# Patient Record
Sex: FEMALE | Race: WHITE
[De-identification: names, ages, dates, MRNs, and addresses within clinical notes are randomized per-mention and may not be internally consistent; named-entity substitution may affect disease eponyms.]

---

## 2017-04-17 ENCOUNTER — HOSPITAL ENCOUNTER (OUTPATIENT)
Dept: HOSPITAL 14 - H.ER | Age: 72
Setting detail: OBSERVATION
LOS: 1 days | Discharge: HOME | End: 2017-04-18
Payer: COMMERCIAL

## 2017-04-17 VITALS — RESPIRATION RATE: 18 BRPM

## 2017-04-17 DIAGNOSIS — Z83.3: ICD-10-CM

## 2017-04-17 DIAGNOSIS — R07.89: Primary | ICD-10-CM

## 2017-04-17 DIAGNOSIS — E78.5: ICD-10-CM

## 2017-04-17 DIAGNOSIS — I25.10: ICD-10-CM

## 2017-04-17 DIAGNOSIS — L29.9: ICD-10-CM

## 2017-04-17 DIAGNOSIS — I10: ICD-10-CM

## 2017-04-17 DIAGNOSIS — E78.00: ICD-10-CM

## 2017-04-17 DIAGNOSIS — Z87.891: ICD-10-CM

## 2017-04-17 DIAGNOSIS — Z95.5: ICD-10-CM

## 2017-04-17 DIAGNOSIS — Z82.5: ICD-10-CM

## 2017-04-17 LAB
ALBUMIN/GLOB SERPL: 1 {RATIO} (ref 1–2.1)
ALP SERPL-CCNC: 80 U/L (ref 38–126)
ALT SERPL-CCNC: 21 U/L (ref 9–52)
AST SERPL-CCNC: 22 U/L (ref 14–36)
BASOPHILS # BLD AUTO: 0 K/UL (ref 0–0.2)
BASOPHILS NFR BLD: 0.4 % (ref 0–2)
BILIRUB SERPL-MCNC: 0.7 MG/DL (ref 0.2–1.3)
BUN SERPL-MCNC: 15 MG/DL (ref 7–17)
CALCIUM SERPL-MCNC: 9.5 MG/DL (ref 8.4–10.2)
CHLORIDE SERPL-SCNC: 107 MMOL/L (ref 98–107)
CO2 SERPL-SCNC: 23 MMOL/L (ref 22–30)
EOSINOPHIL # BLD AUTO: 0.1 K/UL (ref 0–0.7)
EOSINOPHIL NFR BLD: 0.9 % (ref 0–4)
ERYTHROCYTE [DISTWIDTH] IN BLOOD BY AUTOMATED COUNT: 15.1 % (ref 11.5–14.5)
GLOBULIN SER-MCNC: 3.8 GM/DL (ref 2.2–3.9)
GLUCOSE SERPL-MCNC: 97 MG/DL (ref 65–105)
HCT VFR BLD CALC: 42.6 % (ref 34–47)
LYMPHOCYTES # BLD AUTO: 3 K/UL (ref 1–4.3)
LYMPHOCYTES NFR BLD AUTO: 41.5 % (ref 20–40)
MCH RBC QN AUTO: 27.9 PG (ref 27–31)
MCHC RBC AUTO-ENTMCNC: 32.8 G/DL (ref 33–37)
MCV RBC AUTO: 85.3 FL (ref 81–99)
MONOCYTES # BLD: 0.6 K/UL (ref 0–0.8)
MONOCYTES NFR BLD: 8.4 % (ref 0–10)
NEUTROPHILS # BLD: 3.5 K/UL (ref 1.8–7)
NEUTROPHILS NFR BLD AUTO: 48.8 % (ref 50–75)
NRBC BLD AUTO-RTO: 0.1 % (ref 0–0)
PLATELET # BLD: 261 K/UL (ref 130–400)
PMV BLD AUTO: 8.7 FL (ref 7.2–11.7)
POTASSIUM SERPL-SCNC: 4.2 MMOL/L (ref 3.6–5)
PROT SERPL-MCNC: 7.7 G/DL (ref 6.3–8.2)
SODIUM SERPL-SCNC: 144 MMOL/L (ref 132–148)
WBC # BLD AUTO: 7.1 K/UL (ref 4.8–10.8)

## 2017-04-17 PROCEDURE — 85025 COMPLETE CBC W/AUTO DIFF WBC: CPT

## 2017-04-17 PROCEDURE — 80061 LIPID PANEL: CPT

## 2017-04-17 PROCEDURE — 99283 EMERGENCY DEPT VISIT LOW MDM: CPT

## 2017-04-17 PROCEDURE — 93306 TTE W/DOPPLER COMPLETE: CPT

## 2017-04-17 PROCEDURE — 93005 ELECTROCARDIOGRAM TRACING: CPT

## 2017-04-17 PROCEDURE — 84436 ASSAY OF TOTAL THYROXINE: CPT

## 2017-04-17 PROCEDURE — 84484 ASSAY OF TROPONIN QUANT: CPT

## 2017-04-17 PROCEDURE — 84443 ASSAY THYROID STIM HORMONE: CPT

## 2017-04-17 PROCEDURE — 36415 COLL VENOUS BLD VENIPUNCTURE: CPT

## 2017-04-17 PROCEDURE — 96372 THER/PROPH/DIAG INJ SC/IM: CPT

## 2017-04-17 PROCEDURE — 71010: CPT

## 2017-04-17 PROCEDURE — 80053 COMPREHEN METABOLIC PANEL: CPT

## 2017-04-17 NOTE — CP.PCM.HP
History of Present Illness





- History of Present Illness


History of Present Illness: 


Hospitalist Admission H&P (Patient was seen at 3 PM 4/17/17 ER Bed #7 with the 

help [place]  [id number])





PMD: Dr. Ge





CODE STATUS: FULL CODE. NO living will/advance directive. Designates Daughter 

Tosin [phone] as her Health Care Proxy





CHIEF COMPLAINT: Chest Pain





71 year old female (PMHx: HTN, HLD, CAD with Cardiac Catheterization with Stent 

x 3) who presents to [facility] ER via taxi with a chief complaint of chest pain. 

Patient states that she has been having this chest pain on and off for the past 

few days. It is located in the bilateral chest area, is burning in nature, 

lasts for 45 minutes with each occurrence, not associated with any paresthesias

, not radiating to either neck/shoulders/upper extremities. However it is 

associated with (+) SOB. Each time that it has occurred she has been sitting 

down and she just tries to calm herself and the pain will go away after about 

45 minutes. She decided to come to the ER today because when she awoke this 

morning, the aforementioned pain was present and associated with (+) Dizziness, 

(+)Nausea, and (+)Generalized Weakness. Therefore she took a taxi to the ER.





Upon Full ROS she is currently not experiencing the chest pain, is not short of 

breath, is not dizzy, is not nauseous. There is NO dysphagia/odynophagia, NO 

abdominal pain, NO v/d/c, NO burning/pain with urination, NO lightheadedness, 

NO headache, NO new changes in vision/eye pain, NO new changes in hearing/ear 

pain, NO edema, NO paresthesias, (+) Rash after eating fish on this past 

Saturday





PMHx: HTN, HLD, CAD with Cardiac Catheterization 4 years ago with 3 stents at 

an unknown institution (she does not remember the name and can not provide any 

information concerning who her Cardiologist was in the past and has not seen a 

Cardiologist for over 1 year)


PSHx: Cardiac Catheterization


ALL: Unknown food allergy (possibly fish)


Medication: She could not provide much information as to her medications and 

she could only state that she was taking Metoprolol and Hydralazine once a day 

in the morning. Her Daughter who came later during the exam provided her [facility] 

[facility]armacy # [phone] and when I spoke with the pharmacist (the ER pharmacist 

also called them) and the following medications were confirmed: Metoprolol 

Tartrate 100 mg PO 2x/day, Hydralazine 50 mg PO 2x/day, Diovan/HCTZ 320/25 mg 

PO 1x/day, Plavix 75 mg PO 1x/day, Zetia 10 mg PO 1x/day. Please note that the 

daughter brought in the bottles later after my exam and it was noted that the 

bottles for Diovan/HCTZ and Plavix were almost full as patient was not taking 

these medications (this was relayed by the ER Pharmacist)


Social Hx: Retired [employer] , Lives with GrandDaughter, (+) Tobacco 

4 cig/day for 40 years quit 5 years ago, NO alcohol, NO drugs


Family Hx: Mom (Asthma, DM2), Dad (Alcoholism), Sister (DM 2, HLD)








Present on Admission





- Present on Admission


Any Indicators Present on Admission: Yes


History of DVT/PE: No


History of Uncontrolled Diabetes: No


Urinary Catheter: No


Decubitus Ulcer Present: No





Review of Systems





- Review of Systems


Review of Systems: 


SEE HPI





Past Patient History





- Past Medical History & Family History


Pertinent Family History: 


SEE HPI





- Past Social History


Smoking Status: Never Smoked





- CARDIAC


Hx Hypercholesterolemia: Yes


Hx Hypertension: Yes





- PSYCHIATRIC


Hx Substance Use: No





- SURGICAL HISTORY


Other/Comment: Throat surgery





Meds


Allergies/Adverse Reactions: 


 Allergies











Allergy/AdvReac Type Severity Reaction Status Date / Time


 


enoxaparin sodium Allergy  REDNESS Verified 04/17/17 22:01





[From Lovenox]     














Physical Exam





- Constitutional


Appears: Non-toxic, No Acute Distress





- Head Exam


Head Exam: ATRAUMATIC, NORMAL INSPECTION, NORMOCEPHALIC





- Eye Exam


Eye Exam: EOMI, Normal appearance, PERRL


Pupil Exam: NORMAL ACCOMODATION, PERRL





- ENT Exam


ENT Exam: Mucous Membranes Moist, Normal Exam, Normal External Ear Exam, Normal 

Oropharynx





- Neck Exam


Neck exam: Positive for: Normal Inspection


Additional comments: 


NO LYMPHADENOPATHY


NO THYROMEGALY





- Respiratory Exam


Respiratory Exam: Clear to Auscultation Bilateral, NORMAL BREATHING PATTERN


Additional comments: 


NO R/R/W





- Cardiovascular Exam


Cardiovascular Exam: REGULAR RHYTHM, +S1, +S2


Additional comments: 


NO M/R/G





- GI/Abdominal Exam


Additional comments: 


BSX4, SOFT, NT,ND, NO HSM, NO GUARDING/REBOUND TENDERNESS





- Extremities Exam


Extremities exam: Positive for: normal inspection


Additional comments: 


NORMAL CAPILLARY REFILL


NO EDEMA


PULSES ARE STRONG AND EQUAL





- Neurological Exam


Neurological exam: Alert, CN II-XII Intact, Oriented x3





- Psychiatric Exam


Psychiatric exam: Normal Affect, Normal Mood





Results





- Vital Signs


Recent Vital Signs: 





 Last Vital Signs











Temp  98.4 F   04/17/17 21:34


 


Pulse  75   04/17/17 21:34


 


Resp  14   04/17/17 21:34


 


BP  133/68   04/17/17 21:34


 


Pulse Ox  95   04/17/17 21:34














- Labs


Result Diagrams: 


 04/17/17 13:33





 04/17/17 13:33


Labs: 





 Laboratory Results - last 24 hr











  04/17/17





  19:13


 


Troponin I  < 0.0120














- EKG Data


EKG Interpreted by: Myself (T WAVE INVERSION IN I, aVL, V4, V5, V6)





- Imaging and Cardiology


  ** Chest x-ray


Status: Image reviewed by me (NO ACTIVE DISEASE)





Assessment & Plan


(1) Atypical chest pain


Assessment and Plan: 


Place on Observation in Telemetry Unit


Troponin and EKG at 7:30 PM 4/17/17 and 1:30 AM 4/18/17


Consult Cardiologist Dr. Augustine for further recommendations


F/U TSH, T4, and Lipid Panel


Considering her Cardiac History, lack of Cardiology follow up as an outpatient, 

and apparent non-compliance with her medications, 2D Echocardiogram has also 

been ordered


Status: Acute





(2) Allergic reaction


Assessment and Plan: 


Patient states that she developed a pruritic rash on the bilateral antecubital 

fossas, left supraclavicular area, and the bilateral upper back after eating 

some fish this past Saturday. She used some benadryl at home.


On exam there is a erythematous maculopapular rash in the aforementioned areas


NO evidence of respiratory distress/edema on exam


Benadryl 25 mg PO Q6H PRN Pruritus


Pepcid 20 mg PO Q12H


Hydrocortisone 2.5% Cream Topical to the affected areas 2x/day


Status: Suspected





(3) Hypertension


Assessment and Plan: 


Metoprolol 100 mg PO 2x/day


Hydralazine 50 mg PO 2x/day


Diovan 320 mg PO 1x/day


HCTZ 25 mg PO 1x/day


Compliance to medications stressed


Status: Chronic





(4) Hx of coronary artery disease


Assessment and Plan: 


Metoprolol as above


Zetia 10 mg PO 1x/day


Plavix 75 mg PO 1x/day


F/U 2D Echocardiogram


She will need outpatient Cardiology Follow Up


Status: Chronic





(5) Prophylactic measure


Assessment and Plan: 


Lovenox 40 mg SQ 1x/day


Pepcid 20 mg PO 2x/day


Heart Health Diet








Status: Acute

## 2017-04-17 NOTE — RAD
HISTORY:

Cough. Technique: Single view portable semi erect @ 13:52.



COMPARISON:

06/23/2014. 



FINDINGS:



LUNGS:

No active pulmonary disease.



PLEURA:

No significant pleural effusion identified, no pneumothorax apparent.



CARDIOVASCULAR:

 No radiographic findings to suggest acute or significant 

cardiovascular disease.



OSSEOUS STRUCTURES:

No significant abnormalities.



VISUALIZED UPPER ABDOMEN:

Normal.



OTHER FINDINGS:

None.



IMPRESSION:

No active disease. No significant interval change compared to the 

prior examination(s).

## 2017-04-17 NOTE — ED PDOC
HPI: General Adult


Time Seen by Provider: 17 13:09


Chief Complaint (Nursing): Chest Pain


Chief Complaint (Provider): chest pain


History Per: Patient


History/Exam Limitations: no limitations


Additional Complaint(s): 


72yo female complaining of chest pain for 3 days. Also reports dizziness. No 

shortness of breath, nausea, vomit. 





PMD: Joo





Past Medical History


Reviewed: Historical Data, Nursing Documentation, Vital Signs


Vital Signs: 


 Last Vital Signs











Temp  98.1 F   17 12:58


 


Pulse  56 L  17 14:46


 


Resp  16   17 14:46


 


BP  111/70   17 14:46


 


Pulse Ox  98   17 14:46














- Medical History


PMH: HTN, Hypercholesterolemia





- Surgical History


Surgical History: No Surg Hx





- Family History


Family History: States: Unknown Family Hx





- Allergies


Allergies/Adverse Reactions: 


 Allergies











Allergy/AdvReac Type Severity Reaction Status Date / Time


 


No Known Allergies Allergy   Verified 09/14/15 22:39














Review of Systems


ROS Statement: Except As Marked, All Systems Reviewed And Found Negative


Cardiovascular: Positive for: Chest Pain


Respiratory: Negative for: Shortness of Breath


Gastrointestinal: Negative for: Nausea, Vomiting


Neurological: Positive for: Dizziness





Physical Exam





- Reviewed


Nursing Documentation Reviewed: Yes


Vital Signs Reviewed: Yes





- Physical Exam


Appears: Positive for: Well, Non-toxic, No Acute Distress


Head Exam: Positive for: ATRAUMATIC, NORMAL INSPECTION, NORMOCEPHALIC


Skin: Positive for: Warm, Dry


Eye Exam: Positive for: EOMI, PERRL


Cardiovascular/Chest: Positive for: Regular Rate, Rhythm


Respiratory: Positive for: Normal Breath Sounds.  Negative for: Rales, Rhonchi, 

Wheezing


Gastrointestinal/Abdominal: Positive for: Soft.  Negative for: Tenderness


Extremity: Positive for: Normal ROM


Neurologic/Psych: Positive for: Alert, Oriented





- Laboratory Results


Result Diagrams: 


 17 13:33





 17 13:33





- ECG


O2 Sat by Pulse Oximetry: 98 (RA)


Pulse Ox Interpretation: Normal





Medical Decision Making


Medical Decision Makin:


EKG, CXR, Labs ordered. 





Disposition





- Clinical Impression


Clinical Impression: 


 Chest pain





- Patient ED Disposition


Is Patient to be Admitted: Yes





- Disposition


Disposition Time: 14:54


Condition: FAIR





- Pt Status Changed To:


Hospital Disposition Of: Observation





- POA


Present On Arrival: None





Additional Comments





- Additional Comments


Additional Comments: 


Scribe Attestation:


Documented by Mario Moss acting as a scribe for Agusto Gallagher MD.





Provider Scribe Attestation:


All medical record entries made by the Scribe were at my direction and 

personally dictated by me. I have reviewed the chart and agree that the record 

accurately reflects my personal performance of the history, physical exam, 

medical decision making, and the department course for this patient. I have 

also personally directed, reviewed, and agree with the discharge instructions 

and disposition.

## 2017-04-18 VITALS
SYSTOLIC BLOOD PRESSURE: 126 MMHG | TEMPERATURE: 98.2 F | HEART RATE: 74 BPM | DIASTOLIC BLOOD PRESSURE: 70 MMHG | OXYGEN SATURATION: 95 %

## 2017-04-18 LAB
CHOLEST SERPL-MCNC: 182 MG/DL (ref 0–199)
T4 SERPL-MCNC: 8.94 UG/DL (ref 5.5–11)
TSH SERPL-ACNC: 2.51 MIU/ML (ref 0.46–4.68)

## 2017-04-18 NOTE — CP.PCM.DIS
Provider





- Provider


Date of Admission: 


04/17/17 14:54





Attending physician: 


Levi Ly MD





Consults: 


Dr Augustine


Time Spent in preparation of Discharge (in minutes): 30





Diagnosis





- Discharge Diagnosis


(1) Chest pain


Status: Acute


Comment: serial Troponins were all negative.  has been asymptomatic since 

admission








(2) CAD (coronary artery disease)


Status: Acute


Comment: continue Plavix, Metoprolol, Valsartan and Zetia








(3) Hypertension


Status: Chronic


Comment: BP stable.  continue Vlasartan/HCTZ, Metoprolol and Hydralazine











Hospital Course





- Lab Results


Lab Results: 


 Most Recent Lab Values











WBC  7.1 K/uL (4.8-10.8)   04/17/17  13:33    


 


RBC  5.00 Mil/uL (3.80-5.20)   04/17/17  13:33    


 


Hgb  14.0 g/dL (12.0-16.0)   04/17/17  13:33    


 


Hct  42.6 % (34.0-47.0)   04/17/17  13:33    


 


MCV  85.3 fl (81.0-99.0)  D 04/17/17  13:33    


 


MCH  27.9 pg (27.0-31.0)   04/17/17  13:33    


 


MCHC  32.8 g/dL (33.0-37.0)  L  04/17/17  13:33    


 


RDW  15.1 % (11.5-14.5)  H  04/17/17  13:33    


 


Plt Count  261 K/uL (130-400)   04/17/17  13:33    


 


MPV  8.7 fl (7.2-11.7)   04/17/17  13:33    


 


Neut % (Auto)  48.8 % (50.0-75.0)  L  04/17/17  13:33    


 


Lymph % (Auto)  41.5 % (20.0-40.0)  H  04/17/17  13:33    


 


Mono % (Auto)  8.4 % (0.0-10.0)   04/17/17  13:33    


 


Eos % (Auto)  0.9 % (0.0-4.0)   04/17/17  13:33    


 


Baso % (Auto)  0.4 % (0.0-2.0)   04/17/17  13:33    


 


Neut #  3.5 K/uL (1.8-7.0)   04/17/17  13:33    


 


Lymph #  3.0 K/uL (1.0-4.3)   04/17/17  13:33    


 


Mono #  0.6 K/uL (0.0-0.8)   04/17/17  13:33    


 


Eos #  0.1 K/uL (0.0-0.7)   04/17/17  13:33    


 


Baso #  0.0 K/uL (0.0-0.2)   04/17/17  13:33    


 


Sodium  144 mmol/l (132-148)   04/17/17  13:33    


 


Potassium  4.2 MMOL/L (3.6-5.0)   04/17/17  13:33    


 


Chloride  107 mmol/L ()   04/17/17  13:33    


 


Carbon Dioxide  23 mmol/L (22-30)   04/17/17  13:33    


 


Anion Gap  18  (10-20)   04/17/17  13:33    


 


BUN  15 mg/dl (7-17)   04/17/17  13:33    


 


Creatinine  1.1 mg/dL (0.7-1.2)   04/17/17  13:33    


 


Est GFR ( Amer)  59   04/17/17  13:33    


 


Est GFR (Non-Af Amer)  49   04/17/17  13:33    


 


Random Glucose  97 mg/dL ()   04/17/17  13:33    


 


Calcium  9.5 mg/dL (8.4-10.2)   04/17/17  13:33    


 


Total Bilirubin  0.7 mg/dl (0.2-1.3)   04/17/17  13:33    


 


AST  22 U/L (14-36)   04/17/17  13:33    


 


ALT  21 U/L (9-52)   04/17/17  13:33    


 


Alkaline Phosphatase  80 U/L ()   04/17/17  13:33    


 


Troponin I  < 0.0120 ng/mL (0.00-0.120)   04/18/17  06:10    


 


Total Protein  7.7 G/DL (6.3-8.2)   04/17/17  13:33    


 


Albumin  3.9 g/dL (3.5-5.0)   04/17/17  13:33    


 


Globulin  3.8 gm/dL (2.2-3.9)   04/17/17  13:33    


 


Albumin/Globulin Ratio  1.0  (1.0-2.1)   04/17/17  13:33    


 


Triglycerides  147 mg/DL (0-149)   04/18/17  06:10    


 


Cholesterol  182 mg/dL (0-199)   04/18/17  06:10    


 


LDL Cholesterol Direct  110 mg/dL (0-129)   04/18/17  06:10    


 


HDL Cholesterol  30 MG/DL (30-70)   04/18/17  06:10    


 


Thyroxine (T4)  8.94 ug/dl (5.5-11.0)   04/18/17  06:10    


 


TSH 3rd Generation  2.51 mIU/ML (0.46-4.68)   04/18/17  06:10    














- Hospital Course


Hospital Course: 


72 yo female with history of CAD, HTN and HLD admitted to telemetry because of 

on and off chest pain, non-radiating  and described as burning in character. 

She however was chest pain free since she was admitted in the unit. Serial 

Troponins were negative. Dr Augustine, cardiology consult recommended to 

discharge patient discharge patient and he would follow up on her ECHO reading.














Discharge Exam





- Head Exam


Head Exam: ATRAUMATIC, NORMAL INSPECTION, NORMOCEPHALIC





- Eye Exam


Eye Exam: absent: Nystagmus





- ENT Exam


ENT Exam: Mucous Membranes Moist





- Respiratory Exam


Respiratory Exam: absent: Wheezes, Respiratory Distress





- Cardiovascular Exam


Cardiovascular Exam: REGULAR RHYTHM, +S1, +S2





- GI/Abdominal Exam


GI & Abdominal Exam: Soft.  absent: Tenderness





- Rectal Exam


Rectal Exam: Deferred





- Neurological Exam


Neurological exam: Alert, Oriented x3





- Psychiatric Exam


Psychiatric exam: Normal Affect





- Skin


Skin Exam: Dry, Intact





Discharge Plan





- Follow Up Plan


Condition: FAIR


Disposition: HOME/ ROUTINE

## 2017-04-18 NOTE — CARD
--------------- APPROVED REPORT --------------





EKG Measurement

Heart [id number]

MS [id number]

[id number]

[id number]

[id number]



<Conclusion>

Normal sinus rhythm

Possible Left atrial enlargement

Cannot rule out Inferior infarct, age undetermined

ST & T wave abnormality, consider lateral ischemia

Abnormal ECG

## 2017-04-18 NOTE — CP.PCM.CON
History of Present Illness





- History of Present Illness


History of Present Illness: 





71 year old female (PMHx: HTN, HLD, CAD with Cardiac Catheterization with Stent 

x 3) 


who presents to [facility] ER via taxi with a chief complaint of chest pain. 


Patient states that she has been having this chest pain on and off for the past 

few days.


 It is located in the bilateral chest area, is burning in nature, lasts for 45 

minutes with each occurrence





Pain not present on admission





Troponin: neg





EKG: wnl





Does not get pain with exertion





Review of Systems





- Cardiovascular


Cardiovascular: Chest Pain at Rest





Past Patient History





- Past Medical History & Family History


Past Medical History?: Yes





- Past Social History


Smoking Status: Never Smoked





- CARDIAC


Hx Cardiac Disorders: Yes


Hx Hypercholesterolemia: Yes


Hx Hypertension: Yes


Other/Comment: CAD w/ Cath w/ stent x 3





- PULMONARY


Hx Respiratory Disorders: No





- NEUROLOGICAL


Hx Neurological Disorder: No





- HEENT


Hx HEENT Problems: No





- RENAL


Hx Chronic Kidney Disease: No





- ENDOCRINE/METABOLIC


Hx Endocrine Disorders: No





- MUSCULOSKELETAL/RHEUMATOLOGICAL


Hx Falls: No





- PSYCHIATRIC


Hx Substance Use: No





- SURGICAL HISTORY


Hx Surgeries: Yes


Hx Coronary Stent: Yes


Other/Comment: Throat surgery





- ANESTHESIA


Hx Anesthesia: Yes


Hx Anesthesia Reactions: No





Meds


Allergies/Adverse Reactions: 


 Allergies











Allergy/AdvReac Type Severity Reaction Status Date / Time


 


enoxaparin sodium Allergy  REDNESS Verified 04/17/17 22:01





[From Lovenox]     














- Medications


Medications: 


 Current Medications





Clopidogrel Bisulfate (Plavix)  75 mg PO DAILY [facility]


   Last Admin: 04/18/17 10:11 Dose:  75 mg


Diphenhydramine HCl (Benadryl)  25 mg PO Q6 PRN


   PRN Reason: Itching / Pruritus


   Last Admin: 04/17/17 18:46 Dose:  25 mg


Ezetimibe (Zetia)  10 mg PO DAILY [facility]


   Last Admin: 04/18/17 10:11 Dose:  10 mg


Famotidine (Pepcid)  20 mg PO BID [facility]


   Last Admin: 04/18/17 10:11 Dose:  20 mg


Hydralazine HCl (Apresoline)  50 mg PO Q12 [facility]


   Last Admin: 04/18/17 10:11 Dose:  50 mg


Hydrochlorothiazide (Hydrodiuril)  25 mg PO DAILY [facility]


   Last Admin: 04/18/17 10:12 Dose:  25 mg


Hydrocortisone (Hydrocortisone 2.5%)  1 applic TOP BID [facility]


   Last Admin: 04/18/17 10:12 Dose:  1 applic


Metoprolol Tartrate (Lopressor)  100 mg PO Q12 [facility]


   Last Admin: 04/18/17 10:12 Dose:  100 mg


Valsartan (Diovan)  320 mg PO DAILY [facility]


   Last Admin: 04/18/17 10:11 Dose:  320 mg











Results





- Vital Signs


Recent Vital Signs: 


 Last Vital Signs











Temp  98.7 F   04/18/17 08:00


 


Pulse  65   04/18/17 08:00


 


Resp  18   04/18/17 08:00


 


BP  122/72   04/18/17 10:12


 


Pulse Ox  96   04/18/17 08:00














- Labs


Result Diagrams: 


 04/17/17 13:33





 04/17/17 13:33


Labs: 


 Laboratory Results - last 24 hr











  04/17/17 04/18/17





  19:13 06:10


 


Troponin I  < 0.0120  < 0.0120


 


Triglycerides   147


 


Cholesterol   182


 


LDL Cholesterol Direct   110


 


HDL Cholesterol   30


 


Thyroxine (T4)   8.94


 


TSH 3rd Generation   2.51














Assessment & Plan


(1) Atypical chest pain


Assessment and Plan: 


Pain is not classic for CAD


but should f/u with her PMD





may be discharged


Status: Resolved





(2) Hx of coronary artery disease


Status: Chronic





(3) Hypertension


Status: Chronic

## 2017-04-19 NOTE — CARD
--------------- APPROVED REPORT --------------





EXAM: Two-dimensional and M-mode echocardiogram with Doppler and 

color Doppler.



Other Information 

Quality : GoodRhythm : NSR



INDICATION

Chest Pain 



2D DIMENSIONS 

IVSd1.13   (0.7-1.1cm)LVDd4.52   (3.9-5.9cm)

LVOT Diameter2.17   (1.8-2.4cm)PWd0.74   (0.7-1.1cm)

IVSs1.89   (0.8-1.2cm)LVDs2.62   (2.5-4.0cm)

FS (%) 42.1   %PWs1.42   (0.8-1.2cm)



M-Mode DIMENSIONS 

Left Atrium (MM)3.28   (2.5-4.0cm)IVSd1.01   (0.7-1.1cm)

Aortic Root3.00   (2.2-3.7cm)LVDd5.10   (4.0-5.6cm)

Aortic Cusp Exc.1.64   (1.5-2.0cm)PWd0.91   (0.7-1.1cm)

IVSs1.26   cmFS (%) 28   %

LVDs3.67   (2.0-3.8cm)PWs0.91   cm



Mitral Valve

MV E Hfnetpty87.1cm/sMV DECEL [id number] A Ptutytuo63.8cm/s

MV CSQ46ukK/A ratio0.8MVA (PHT)3.10cm2



TDI

Lateral E' Peak V7.82cm/sMedial E' Peak V4.97cm/sE/Lateral E'6.5

E/Medial E'10.3



Tricuspid Valve

TR Peak Tlollpcx017nr/sRAP [id number] Peak Gr.20mmHg

[id number]



 LEFT VENTRICLE 

The left ventricle is normal size.

There is normal left ventricular wall thickness.

The left ventricular function is normal.

The left ventricular ejection fraction is 65%

There is normal LV segmental wall motion.

The left ventricular diastolic function is normal.

No left ventricle thrombus noted on this study.

There is no ventricular septal defect visualized.

There is no left ventricular aneurysm. 

There is no mass noted in the left ventricle.



 RIGHT VENTRICLE 

The right ventricle is normal size.

There is normal right ventricular wall thickness.

The right ventricular systolic function is normal.



 ATRIA 

The left atrium size is normal.

The right atrium size is normal.

The interatrial septum is intact with no evidence for an atrial 

septal defect.



 AORTIC VALVE 

The aortic valve is normal in structure and function.

No aortic regurgitation is present.

There is no aortic valvular stenosis. 

There is no aortic valvular vegetation.



 MITRAL VALVE 

The mitral valve is normal in structure and function.

There is no evidence of mitral valve prolapse.

There is no mitral valve stenosis.

There is no mitral valve regurgitation noted.



 TRICUSPID VALVE 

The tricuspid valve is normal in structure and function.

There is no tricuspid valve regurgitation noted.

There is no tricuspid valve prolapse or vegetation.

There is no tricuspid valve stenosis. 



 PULMONIC VALVE 

The pulmonary valve is normal in structure and function.

There is no pulmonic valvular regurgitation. 

There is no pulmonic valvular stenosis.



 GREAT VESSELS 

The aortic root is normal in size.

The ascending aorta is normal in size.

The IVC is normal in size and collapses >50% with inspiration.



 PERICARDIAL EFFUSION 

There is a small anterior pericardial effusion. There are no 

echocardiographic signs of tamponade.

There is no pleural effusion.



<Conclusion>

Normal LV Systolic Function

Small Anterior Pericardial Effusion

## 2017-04-19 NOTE — CARD
--------------- APPROVED REPORT --------------





EKG Measurement

Heart [id number]

MS [id number]

[id number]

[id number]

[id number]



<Conclusion>

Normal sinus rhythm

Possible Left atrial enlargement

Cannot rule out Inferior infarct, age undetermined

T wave abnormality, consider lateral ischemia

Abnormal ECG

## 2017-10-19 ENCOUNTER — HOSPITAL ENCOUNTER (OUTPATIENT)
Dept: HOSPITAL 14 - H.ER | Age: 72
Setting detail: OBSERVATION
LOS: 1 days | Discharge: HOME | End: 2017-10-20
Attending: INTERNAL MEDICINE | Admitting: INTERNAL MEDICINE
Payer: MEDICARE

## 2017-10-19 VITALS — RESPIRATION RATE: 18 BRPM

## 2017-10-19 DIAGNOSIS — I25.10: ICD-10-CM

## 2017-10-19 DIAGNOSIS — E78.00: ICD-10-CM

## 2017-10-19 DIAGNOSIS — E66.3: ICD-10-CM

## 2017-10-19 DIAGNOSIS — I10: ICD-10-CM

## 2017-10-19 DIAGNOSIS — Z79.82: ICD-10-CM

## 2017-10-19 DIAGNOSIS — Z95.5: ICD-10-CM

## 2017-10-19 DIAGNOSIS — R07.89: Primary | ICD-10-CM

## 2017-10-19 DIAGNOSIS — G44.209: ICD-10-CM

## 2017-10-19 DIAGNOSIS — Z87.891: ICD-10-CM

## 2017-10-19 DIAGNOSIS — Z79.02: ICD-10-CM

## 2017-10-19 DIAGNOSIS — Z79.899: ICD-10-CM

## 2017-10-19 DIAGNOSIS — E78.5: ICD-10-CM

## 2017-10-19 LAB
ALBUMIN/GLOB SERPL: 1.1 {RATIO} (ref 1–2.1)
ALP SERPL-CCNC: 74 U/L (ref 38–126)
ALT SERPL-CCNC: 20 U/L (ref 9–52)
APTT BLD: 29.6 SECONDS (ref 25.6–37.1)
AST SERPL-CCNC: 29 U/L (ref 14–36)
BACTERIA #/AREA URNS HPF: (no result) /[HPF]
BASOPHILS # BLD AUTO: 0.1 K/UL (ref 0–0.2)
BASOPHILS NFR BLD: 1 % (ref 0–2)
BILIRUB SERPL-MCNC: 0.8 MG/DL (ref 0.2–1.3)
BILIRUB UR-MCNC: NEGATIVE MG/DL
BUN SERPL-MCNC: 15 MG/DL (ref 7–17)
CALCIUM SERPL-MCNC: 8.6 MG/DL (ref 8.4–10.2)
CHLORIDE SERPL-SCNC: 107 MMOL/L (ref 98–107)
CO2 SERPL-SCNC: 24 MMOL/L (ref 22–30)
COLOR UR: YELLOW
EOSINOPHIL # BLD AUTO: 0.1 K/UL (ref 0–0.7)
EOSINOPHIL NFR BLD: 1 % (ref 0–4)
ERYTHROCYTE [DISTWIDTH] IN BLOOD BY AUTOMATED COUNT: 15.3 % (ref 11.5–14.5)
GLOBULIN SER-MCNC: 3.6 GM/DL (ref 2.2–3.9)
GLUCOSE SERPL-MCNC: 110 MG/DL (ref 65–105)
GLUCOSE UR STRIP-MCNC: (no result) MG/DL
HCT VFR BLD CALC: 37.7 % (ref 34–47)
KETONES UR STRIP-MCNC: NEGATIVE MG/DL
LEUKOCYTE ESTERASE UR-ACNC: (no result) LEU/UL
LIPASE SERPL-CCNC: 85 U/L (ref 23–300)
LYMPHOCYTES # BLD AUTO: 2.1 K/UL (ref 1–4.3)
LYMPHOCYTES NFR BLD AUTO: 41 % (ref 20–40)
MCH RBC QN AUTO: 27.2 PG (ref 27–31)
MCHC RBC AUTO-ENTMCNC: 33 G/DL (ref 33–37)
MCV RBC AUTO: 82.4 FL (ref 81–99)
MONOCYTES # BLD: 0.4 K/UL (ref 0–0.8)
MONOCYTES NFR BLD: 8 % (ref 0–10)
NEUTROPHILS # BLD: 2.6 K/UL (ref 1.8–7)
NEUTROPHILS NFR BLD AUTO: 49 % (ref 50–75)
NRBC BLD AUTO-RTO: 0.4 % (ref 0–0)
PH UR STRIP: 7 [PH] (ref 5–8)
PLATELET # BLD: 292 K/UL (ref 130–400)
PMV BLD AUTO: 10 FL (ref 7.2–11.7)
POTASSIUM SERPL-SCNC: 4.5 MMOL/L (ref 3.6–5)
PROT SERPL-MCNC: 7.4 G/DL (ref 6.3–8.2)
PROT UR STRIP-MCNC: NEGATIVE MG/DL
RBC # UR STRIP: NEGATIVE /UL
RBC #/AREA URNS HPF: < 1 /HPF (ref 0–3)
SODIUM SERPL-SCNC: 140 MMOL/L (ref 132–148)
SP GR UR STRIP: 1.01 (ref 1–1.03)
UROBILINOGEN UR-MCNC: (no result) MG/DL (ref 0.2–1)
WBC # BLD AUTO: 5.2 K/UL (ref 4.8–10.8)
WBC #/AREA URNS HPF: 1 /HPF (ref 0–5)

## 2017-10-19 PROCEDURE — 81003 URINALYSIS AUTO W/O SCOPE: CPT

## 2017-10-19 PROCEDURE — 71020: CPT

## 2017-10-19 PROCEDURE — 87086 URINE CULTURE/COLONY COUNT: CPT

## 2017-10-19 PROCEDURE — 70450 CT HEAD/BRAIN W/O DYE: CPT

## 2017-10-19 PROCEDURE — 83690 ASSAY OF LIPASE: CPT

## 2017-10-19 PROCEDURE — 85730 THROMBOPLASTIN TIME PARTIAL: CPT

## 2017-10-19 PROCEDURE — 84443 ASSAY THYROID STIM HORMONE: CPT

## 2017-10-19 PROCEDURE — 85025 COMPLETE CBC W/AUTO DIFF WBC: CPT

## 2017-10-19 PROCEDURE — 93005 ELECTROCARDIOGRAM TRACING: CPT

## 2017-10-19 PROCEDURE — 83036 HEMOGLOBIN GLYCOSYLATED A1C: CPT

## 2017-10-19 PROCEDURE — 96374 THER/PROPH/DIAG INJ IV PUSH: CPT

## 2017-10-19 PROCEDURE — 82948 REAGENT STRIP/BLOOD GLUCOSE: CPT

## 2017-10-19 PROCEDURE — 36415 COLL VENOUS BLD VENIPUNCTURE: CPT

## 2017-10-19 PROCEDURE — 80053 COMPREHEN METABOLIC PANEL: CPT

## 2017-10-19 PROCEDURE — 84484 ASSAY OF TROPONIN QUANT: CPT

## 2017-10-19 PROCEDURE — 99285 EMERGENCY DEPT VISIT HI MDM: CPT

## 2017-10-19 PROCEDURE — 80061 LIPID PANEL: CPT

## 2017-10-19 PROCEDURE — 85610 PROTHROMBIN TIME: CPT

## 2017-10-19 RX ADMIN — INSULIN LISPRO SCH: 100 INJECTION, SOLUTION INTRAVENOUS; SUBCUTANEOUS at 22:04

## 2017-10-19 RX ADMIN — INSULIN LISPRO SCH: 100 INJECTION, SOLUTION INTRAVENOUS; SUBCUTANEOUS at 18:10

## 2017-10-19 NOTE — ED PDOC
HPI: General Adult


Time Seen by Provider: 10/19/17 08:37


Chief Complaint (Nursing): Headache


Chief Complaint (Provider): headache, chest pain


History Per: Patient,  ([facility] #[id number])


History/Exam Limitations: no limitations


Onset/Duration Of Symptoms: Hrs (10), Sudden Onset


Current Symptoms Are (Timing): Still Present


Severity: Moderate


Additional Complaint(s): 





73yo female with multiple medical problems presents c/o severe diffuse headache 

which started last night associated with right and central chest pain, 

dizziness and nausea. Denies cough, SOB, syncope, focal weakness or fever. 

States took ASA 81mg this morning. Has a history of CAD s/p stents several 

years ago, but denies having seen cardiologist in last year and she's unsure 

which hospital placed the stents. 





Past Medical History


Reviewed: Historical Data, Nursing Documentation, Vital Signs


Vital Signs: 


 Last Vital Signs











Temp  98.9 F   10/19/17 08:27


 


Pulse  82   10/19/17 08:27


 


Resp  18   10/19/17 08:27


 


BP  139/78   10/19/17 08:27


 


Pulse Ox  97   10/19/17 10:46














- Medical History


PMH: CAD, HTN, Hypercholesterolemia


   Denies: Chronic Kidney Disease





- Surgical History


Surgical History: Coronary Stent, 





- Family History


Family History: States: Unknown Family Hx





- Social History


Current smoker - smoking cessation education provided: No





- Home Medications


Home Medications: 


 Ambulatory Orders











 Medication  Instructions  Recorded


 


Clopidogrel [Plavix] 75 mg PO DAILY 17


 


Ergocalciferol (Vitamin D2) 50,000 unit PO SAT 17





[Vitamin D2]  


 


Ezetimibe [Zetia] 10 mg PO DAILY 17


 


Metoprolol Tartrate [Lopressor] 100 mg PO Q12H 17


 


Valsartan/Hydrochlorothiazide 1 tab PO DAILY 17





[Diovan Hct 320-25 mg Tablet]  


 


hydrALAZINE [Apresoline] 50 mg PO BID 17


 


DiphenhydrAMINE [Benadryl] 25 mg PO Q6 PRN #0 cap 17


 


Famotidine [Pepcid] 20 mg PO BID  tab 17


 


Hydrocortisone 2.5% 1 applic TOP BID  oint 17


 


hydrALAZINE [Apresoline] 50 mg PO Q12  tab 17


 


hydroCHLOROthiazide [Hydrodiuril] 25 mg PO DAILY  tab 17














- Allergies


Allergies/Adverse Reactions: 


 Allergies











Allergy/AdvReac Type Severity Reaction Status Date / Time


 


enoxaparin sodium Allergy  REDNESS Verified 10/19/17 08:34





[From Lovenox]     














Review of Systems


ROS Statement: Except As Marked, All Systems Reviewed And Found Negative


Constitutional: Positive for: Weakness.  Negative for: Fever, Sweats


ENT: Negative for: Nose Pain, Throat Pain


Cardiovascular: Positive for: Chest Pain, Palpitations, Light Headedness.  

Negative for: Orthopnea


Respiratory: Negative for: Cough, Shortness of Breath, Hemoptysis


Gastrointestinal: Positive for: Nausea.  Negative for: Vomiting, Abdominal Pain


Genitourinary Female: Negative for: Dysuria, Frequency


Musculoskeletal: Negative for: Neck Pain, Shoulder Pain


Skin: Negative for: Rash, Lesions, Jaundice


Neurological: Positive for: Headache, Dizziness.  Negative for: Numbness, 

Incoordination, Change in Speech, Confusion





Physical Exam





- Reviewed


Nursing Documentation Reviewed: Yes


Vital Signs Reviewed: Yes





- Physical Exam


Appears: Positive for: Non-toxic (anxious appearing), No Acute Distress


Head Exam: Positive for: ATRAUMATIC, NORMAL INSPECTION, NORMOCEPHALIC


Skin: Positive for: Normal Color, Warm, DRY


Eye Exam: Positive for: Normal appearance, EOMI, PERRL.  Negative for: 

Periorbital swelling, Conjunctival injection


ENT: Positive for: Normal ENT Inspection


Neck: Positive for: Normal, Painless ROM


Cardiovascular/Chest: Positive for: Regular Rate, Rhythm


Respiratory: Positive for: CNT, Normal Breath Sounds


Gastrointestinal/Abdominal: Positive for: Bowel Sounds, Soft.  Negative for: 

Tenderness


Back: Positive for: Normal Inspection


Extremity: Positive for: Normal ROM


Neurologic/Psych: Positive for: Alert, Oriented.  Negative for: Motor/Sensory 

Deficits, Aphasia





- Laboratory Results


Result Diagrams: 


 10/19/17 09:15





 10/19/17 09:15





- ECG


O2 Sat by Pulse Oximetry: 97





Medical Decision Making


Medical Decision Making: 





workup for chest pain/ headache in setting of known hx CAD and on anti-platelet 

medication initiated.


Tylenol/reglan and gentle IVF given for 





labs reviewed, trop 0.05


Hgb, Plt and renal function normal





CT brain report from radiologist reviewed, neg for bleed





Prior admission chart reviewed spring 2017, cardiologist consult performed 

recommended followup outpatient but never performed





Will place Obs hospitalist PMD Dr Ge. Dr Perry aware 1045am











Disposition





- Clinical Impression


Clinical Impression: 


 Headache, Chest pain, CAD (coronary artery disease)








- Patient ED Disposition


Is Patient to be Admitted: Yes





- Disposition


Disposition Time: 10:40


Condition: FAIR


Forms:  [facility] (English)





- Pt Status Changed To:


Hospital Disposition Of: Observation





- POA


Present On Arrival: None

## 2017-10-19 NOTE — RAD
HISTORY:

CP  



COMPARISON:

4/17/2017



TECHNIQUE:

Chest PA and lateral



FINDINGS:



LUNGS:

No active pulmonary disease.



PLEURA:

No significant pleural effusion identified. No pneumothorax apparent.



CARDIOVASCULAR:

Normal.



OSSEOUS STRUCTURES:

No significant abnormalities.



VISUALIZED UPPER ABDOMEN:

Normal.



OTHER FINDINGS:

None.



IMPRESSION:

No active disease.

## 2017-10-19 NOTE — CT
PROCEDURE:  CT HEAD WITHOUT CONTRAST.



HISTORY:

severe headache



COMPARISON:

6/23/2014 



TECHNIQUE:

Axial computed tomography images were obtained through the head/brain 

without intravenous contrast.  



Radiation dose:



Total exam DLP = 1601.48 mGy-cm.



This CT exam was performed using one or more of the following dose 

reduction techniques: Automated exposure control, adjustment of the 

mA and/or kV according to patient size, and/or use of iterative 

reconstruction technique.



FINDINGS:



HEMORRHAGE:

No intracranial hemorrhage. 



BRAIN:

No mass effect or edema.  No significant atrophy.  Mild 

periventricular white matter lucency consistent with chronic 

microvascular ischemic change.



VENTRICLES:

Unremarkable. No hydrocephalus. 



CALVARIUM:

Unremarkable.



PARANASAL SINUSES:

Unremarkable as visualized. No significant inflammatory changes.



MASTOID AIR CELLS:

Unremarkable as visualized. No inflammatory changes.



OTHER FINDINGS:

None.



IMPRESSION:

No intracranial mass, hemorrhage or evidence of acute infarct. Mild 

chronic white matter ischemic change.

## 2017-10-19 NOTE — CP.PCM.HP
History of Present Illness





- History of Present Illness


History of Present Illness: 





CHIEF COMPLAINT: Chest Pain





HPI 72F PMH HTN, HLD, CAD with stent x3 presents to [facility] ER with a 12 hour 

history of acute, nondescript, moderate chest pain that lasted appx one hour, 

not associated with any GI disturbance, palpitations, diaphoresis, or dyspnea. 

Patient states she has also had a severe frontal headache that has lasted about 

12 hours, constant, unchanging, and not ameliorated with nsaids at home. She 

denies a hx of migraines. She also complains of increased frequency in urination

, denies polydipsia, as well as some discomfort on urination. HD stable NAD.    





In ER, troponin neg x1 (0.05), EKG no acute changes, will place under OBS for 

concern for possible ACS. 





ROS: per HPI all other systems reviewed and negative





PMHx: HTN, HLD, CAD with Cardiac Catheterization 5 years ago with 3 stents 


PSHx: Cardiac Catheterization


ALL: Unknown food allergy (possibly fish)


Medication: as below


Social Hx: Retired [employer] , Lives with GrandDaughter, (+) Tobacco 

4 cig/day for 40 years quit 5 years ago, NO alcohol, NO drugs


Family Hx: Mom (Asthma, DM2), Dad (Alcoholism), Sister (DM 2, HLD)





Vitals reviewed and stable


Constitutional- cooperative, awake, alert.


Head- NCAT, PERRL


Eye- PERRL, normal accommodation


ENT- normal exam, MMM.


Neck- normal inspection, supple, no JVD


Respiratory- CTAB, no wheezes rales rhonchi


Cardiovascular- RRR, +S1, +S2 no MRG


GI/Abdominal- normal bowel sounds, soft


Skin- warm, dry


Extremities Exam- normal capillary refill, normal inspection


Neurological Exam- alert, oriented


Psych- normal mood, normal affect


























  10/19/17 10/19/17 10/19/17





  09:15 09:15 09:15


 


WBC    5.2


 


RBC    4.57


 


Hgb    12.5


 


Hct    37.7


 


MCV    82.4  D


 


MCH    27.2


 


MCHC    33.0


 


RDW    15.3 H


 


Plt Count    292


 


MPV    10.0


 


Neut % (Auto)    49.0 L


 


Lymph % (Auto)    41.0 H


 


Mono % (Auto)    8.0


 


Eos % (Auto)    1.0


 


Baso % (Auto)    1.0


 


Neut #    2.6


 


Lymph #    2.1


 


Mono #    0.4


 


Eos #    0.1


 


Baso #    0.1


 


PT  11.6  


 


INR  1.0  


 


APTT  29.6  


 


Sodium   140 


 


Potassium   4.5 


 


Chloride   107 


 


Carbon Dioxide   24 


 


Anion Gap   14 


 


BUN   15 


 


Creatinine   0.9 


 


Est GFR ( Amer)   > 60 


 


Est GFR (Non-Af Amer)   > 60 


 


Random Glucose   110 H 


 


Calcium   8.6 


 


Total Bilirubin   0.8 


 


AST   29 


 


ALT   20 


 


Alkaline Phosphatase   74 


 


Troponin I   0.0510 


 


Total Protein   7.4 


 


Albumin   3.9 


 


Globulin   3.6 


 


Albumin/Globulin Ratio   1.1 


 


Lipase   85 








EKG: rate 73, t wave flattening in anterolateral leads slightly changed 

compared to prior ekg with inversions. no acute ST segment elevations or 

depressions appreciated. 





Head CT negative for acute pathology.








 Active Medications





10/19/17 10:59


Acetaminophen [Tylenol 325mg tab]   650 mg PO Q6 PRN 


Ondansetron [Zofran Inj]   4 mg IVP Q6 PRN 





10/19/17 11:15


Clopidogrel [Plavix]   75 mg PO DAILY 





10/19/17 11:30


Insulin Lispro [humALOG]   See Protocol  SC ACHS 





10/19/17 13:15


Hydralazine HCl [Hydralazine HCl]   100 mg PO Q12H 


Labetalol Hydrochloride [Normodyne]   300 mg PO Q12H 





10/19/17 17:00


Heparin   5,000 units SC Q8 





10/19/17 22:00


Atorvastatin [Lipitor]   80 mg PO HS 





10/20/17 09:00


Aspirin [Aspirin Chewable]   81 mg PO DAILY 








Assessment and Plan:





72F PMH HTN, HLD, CAD with stent x3 presents to [facility] ER with a 12 hour history 

of acute, nondescript, moderate chest pain that lasted appx one hour, not 

associated with any GI disturbance, palpitations, diaphoresis, or dyspnea. 

Patient states she has also had a severe frontal, temporal headache that has 

lasted about 12 hours, constant, unchanging, in a bandlike distribution, and 

not ameliorated with nsaids at home. She denies a hx of migraines. She also 

complains of increased frequency in urination, denies polydipsia, as well as 

some discomfort on urination. HD stable NAD. 





CHEST PAIN


CAD


concern for ACS


Troponin 0.05, trend enzymes q6H


EKG - t wave flattening and inversions, slightly changed from prior. repeat 

with troponins


continue ASA, plavix, labetalol, lipitor





HEADACHE, tension


frontal headache in temples in band like fashion


tylenol, toradol and zofran given in ER


Head CT neg





DYSURIA, POLYURIA


Urinalysis and UCx pending


No WBC afebrile


HgbA1c pending





HTN


patient home meds according to pharmacy: 


Labetalol 300 mg BID


Hydralazine 100 mg BID


Norvasc 5 mg daily


Diovan/HCTZ 320/25


However, patient only brought labetalol and hydralazine with her, stating she 

only takes labetalol and hydralazine, because she has too many medications. 


Pt BP in /78, 130/76


Will continue: Labetalol, Hydralazine, and Diovan/HCTZ


if additional agents required, will restart Norvasc 5 mg po daily





HLD


continue statin





VTE PPx


allergic to lovenox


heparin 5000 sq q8h








Present on Admission





- Present on Admission


Any Indicators Present on Admission: No





Past Patient History





- Past Medical History & Family History


Past Medical History?: Yes





- Past Social History


Smoking Status: Never Smoked





- CARDIAC


Hx Hypercholesterolemia: Yes


Hx Hypertension: Yes





- PULMONARY


Hx Respiratory Disorders: No





- NEUROLOGICAL


Hx Neurological Disorder: No





- HEENT


Hx HEENT Problems: No





- RENAL


Hx Chronic Kidney Disease: No





- ENDOCRINE/METABOLIC


Hx Endocrine Disorders: No





- MUSCULOSKELETAL/RHEUMATOLOGICAL


Hx Falls: No





- PSYCHIATRIC


Hx Substance Use: No





- SURGICAL HISTORY


Hx Coronary Stent: Yes





- ANESTHESIA


Hx Anesthesia: Yes


Hx Anesthesia Reactions: No





Meds


Allergies/Adverse Reactions: 


 Allergies











Allergy/AdvReac Type Severity Reaction Status Date / Time


 


enoxaparin sodium Allergy  REDNESS Verified 10/19/17 08:34





[From Lovenox]     














Results





- Vital Signs


Recent Vital Signs: 





 Last Vital Signs











Temp  98.6 F   10/19/17 12:30


 


Pulse  69   10/19/17 12:30


 


Resp  18   10/19/17 12:30


 


BP  130/76   10/19/17 12:30


 


Pulse Ox  97   10/19/17 12:30














- Labs


Result Diagrams: 


 10/19/17 09:15





 10/19/17 09:15


Labs: 





 Laboratory Results - last 24 hr











  10/19/17 10/19/17 10/19/17





  09:15 09:15 09:15


 


WBC  5.2  


 


RBC  4.57  


 


Hgb  12.5  


 


Hct  37.7  


 


MCV  82.4  D  


 


MCH  27.2  


 


MCHC  33.0  


 


RDW  15.3 H  


 


Plt Count  292  


 


MPV  10.0  


 


Neut % (Auto)  49.0 L  


 


Lymph % (Auto)  41.0 H  


 


Mono % (Auto)  8.0  


 


Eos % (Auto)  1.0  


 


Baso % (Auto)  1.0  


 


Neut #  2.6  


 


Lymph #  2.1  


 


Mono #  0.4  


 


Eos #  0.1  


 


Baso #  0.1  


 


PT    11.6


 


INR    1.0


 


APTT    29.6


 


Sodium   140 


 


Potassium   4.5 


 


Chloride   107 


 


Carbon Dioxide   24 


 


Anion Gap   14 


 


BUN   15 


 


Creatinine   0.9 


 


Est GFR ( Amer)   > 60 


 


Est GFR (Non-Af Amer)   > 60 


 


Random Glucose   110 H 


 


Calcium   8.6 


 


Total Bilirubin   0.8 


 


AST   29 


 


ALT   20 


 


Alkaline Phosphatase   74 


 


Troponin I   0.0510 


 


Total Protein   7.4 


 


Albumin   3.9 


 


Globulin   3.6 


 


Albumin/Globulin Ratio   1.1 


 


Lipase   85

## 2017-10-20 VITALS
TEMPERATURE: 98.7 F | OXYGEN SATURATION: 94 % | SYSTOLIC BLOOD PRESSURE: 128 MMHG | DIASTOLIC BLOOD PRESSURE: 74 MMHG | HEART RATE: 76 BPM

## 2017-10-20 LAB
CHOLEST SERPL-MCNC: 180 MG/DL (ref 0–199)
TSH SERPL-ACNC: 2.13 MIU/ML (ref 0.46–4.68)

## 2017-10-20 RX ADMIN — INSULIN LISPRO SCH: 100 INJECTION, SOLUTION INTRAVENOUS; SUBCUTANEOUS at 12:33

## 2017-10-20 RX ADMIN — INSULIN LISPRO SCH: 100 INJECTION, SOLUTION INTRAVENOUS; SUBCUTANEOUS at 06:47

## 2017-10-20 NOTE — CP.PCM.DIS
Provider





- Provider


Date of Admission: 


10/19/17 11:05





Attending physician: 


Mamta Perry DO





Primary care physician: 





Dr. Ge


Time Spent in preparation of Discharge (in minutes): 20





Hospital Course





- Lab Results


Lab Results: 


 Micro Results





10/19/17 13:34   Urine   Urine Culture - Final


                            No Growth (<1,000 CFU/ML)





 Most Recent Lab Values











WBC  5.2 K/uL (4.8-10.8)   10/19/17  09:15    


 


RBC  4.57 Mil/uL (3.80-5.20)   10/19/17  09:15    


 


Hgb  12.5 g/dL (12.0-16.0)   10/19/17  09:15    


 


Hct  37.7 % (34.0-47.0)   10/19/17  09:15    


 


MCV  82.4 fl (81.0-99.0)  D 10/19/17  09:15    


 


MCH  27.2 pg (27.0-31.0)   10/19/17  09:15    


 


MCHC  33.0 g/dL (33.0-37.0)   10/19/17  09:15    


 


RDW  15.3 % (11.5-14.5)  H  10/19/17  09:15    


 


Plt Count  292 K/uL (130-400)   10/19/17  09:15    


 


MPV  10.0 fl (7.2-11.7)   10/19/17  09:15    


 


Neut % (Auto)  49.0 % (50.0-75.0)  L  10/19/17  09:15    


 


Lymph % (Auto)  41.0 % (20.0-40.0)  H  10/19/17  09:15    


 


Mono % (Auto)  8.0 % (0.0-10.0)   10/19/17  09:15    


 


Eos % (Auto)  1.0 % (0.0-4.0)   10/19/17  09:15    


 


Baso % (Auto)  1.0 % (0.0-2.0)   10/19/17  09:15    


 


Neut #  2.6 K/uL (1.8-7.0)   10/19/17  09:15    


 


Lymph #  2.1 K/uL (1.0-4.3)   10/19/17  09:15    


 


Mono #  0.4 K/uL (0.0-0.8)   10/19/17  09:15    


 


Eos #  0.1 K/uL (0.0-0.7)   10/19/17  09:15    


 


Baso #  0.1 K/uL (0.0-0.2)   10/19/17  09:15    


 


PT  11.6 Seconds (9.8-13.1)   10/19/17  09:15    


 


INR  1.0  (0.9-1.2)   10/19/17  09:15    


 


APTT  29.6 Seconds (25.6-37.1)   10/19/17  09:15    


 


Sodium  140 mmol/l (132-148)   10/19/17  09:15    


 


Potassium  4.5 MMOL/L (3.6-5.0)   10/19/17  09:15    


 


Chloride  107 mmol/L ()   10/19/17  09:15    


 


Carbon Dioxide  24 mmol/L (22-30)   10/19/17  09:15    


 


Anion Gap  14  (10-20)   10/19/17  09:15    


 


BUN  15 mg/dl (7-17)   10/19/17  09:15    


 


Creatinine  0.9 mg/dL (0.7-1.2)   10/19/17  09:15    


 


Est GFR ( Amer)  > 60   10/19/17  09:15    


 


Est GFR (Non-Af Amer)  > 60   10/19/17  09:15    


 


POC Glucose (mg/dL)  132 mg/dL ()  H  10/20/17  05:04    


 


Random Glucose  110 mg/dL ()  H  10/19/17  09:15    


 


Hemoglobin A1c  6.6 % (4.2-6.5)  H  10/19/17  13:36    


 


Calcium  8.6 mg/dL (8.4-10.2)   10/19/17  09:15    


 


Total Bilirubin  0.8 mg/dl (0.2-1.3)   10/19/17  09:15    


 


AST  29 U/L (14-36)   10/19/17  09:15    


 


ALT  20 U/L (9-52)   10/19/17  09:15    


 


Alkaline Phosphatase  74 U/L ()   10/19/17  09:15    


 


Troponin I  0.0280 ng/mL (0.00-0.120)   10/19/17  22:15    


 


Total Protein  7.4 G/DL (6.3-8.2)   10/19/17  09:15    


 


Albumin  3.9 g/dL (3.5-5.0)   10/19/17  09:15    


 


Globulin  3.6 gm/dL (2.2-3.9)   10/19/17  09:15    


 


Albumin/Globulin Ratio  1.1  (1.0-2.1)   10/19/17  09:15    


 


Triglycerides  135 mg/DL (0-149)   10/20/17  04:20    


 


Cholesterol  180 mg/dL (0-199)   10/20/17  04:20    


 


LDL Cholesterol Direct  119 mg/dL (0-129)   10/20/17  04:20    


 


HDL Cholesterol  29 MG/DL (30-70)  L  10/20/17  04:20    


 


Lipase  85 U/L ()   10/19/17  09:15    


 


TSH 3rd Generation  2.13 mIU/ML (0.46-4.68)   10/20/17  04:20    


 


Urine Color  Yellow  (YELLOW)   10/19/17  13:30    


 


Urine Clarity  Clear  (Clear)   10/19/17  13:30    


 


Urine pH  7.0  (5.0-8.0)   10/19/17  13:30    


 


Ur Specific Gravity  1.010  (1.003-1.030)   10/19/17  13:30    


 


Urine Protein  Negative mg/dL (NEGATIVE)   10/19/17  13:30    


 


Urine Glucose (UA)  Neg mg/dL (Normal)   10/19/17  13:30    


 


Urine Ketones  Negative mg/dL (NEGATIVE)   10/19/17  13:30    


 


Urine Blood  Negative  (NEGATIVE)   10/19/17  13:30    


 


Urine Nitrate  Negative  (NEGATIVE)   10/19/17  13:30    


 


Urine Bilirubin  Negative  (NEGATIVE)   10/19/17  13:30    


 


Urine Urobilinogen  0.2-1.0 mg/dL (0.2-1.0)   10/19/17  13:30    


 


Ur Leukocyte Esterase  Neg Orlin/uL (Negative)   10/19/17  13:30    


 


Urine RBC (Auto)  < 1 /hpf (0-3)   10/19/17  13:30    


 


Urine Microscopic WBC  1 /hpf (0-5)   10/19/17  13:30    


 


Ur Squamous Epith Cells  < 1 /hpf (0-5)   10/19/17  13:30    


 


Urine Bacteria  Few  (<OCC)  H  10/19/17  13:30    














- Hospital Course


Hospital Course: 


73 y/o F with  PMH HTN, HLD, CAD with stent x3 presented to [facility] ER with a 12 

hour history of acute, nondescript, moderate chest pain that lasted appx one 

hour, not associated with any GI disturbance, palpitations, diaphoresis, or 

dyspnea. Patient states she has also had a severe frontal, temporal headache 

that has lasted for 2 days minimally responsive to ibuprofen , constant, 

unchanging, in a bandlike distribution. She denies a hx of migraines. She also 

complains of increased frequency in urination, denies polydipsia, as well as 

some discomfort on urination. HD stable NAD. 


Her Ct head showed no acute pathology.Her vitals were within normal limits and 

her blood work up including urine did not show any infection , anemia , renal , 

liver or thyroid problem. She was placed under observation in telemetry for 

chest pain and HA. Her chest pain resolved and her HA improved with fioricet 

Discussed with patient results of her tests and all questions answered . Most 

likely Headache are tension  headaches. Started on fioricet PRN and advised to 

follow up with her PMD DR. Ge as out patient . Patient agrees with 

discharge plan








1.Atypical chest pain - ACs ruled out 


2.Headache of unclear etiology - most likely tension HA 


3.History of CAD


4. hypertension


5. overweight BMI 33


Dyslipidemia





























Discharge Exam





- Head Exam


Head Exam: ATRAUMATIC, NORMAL INSPECTION, NORMOCEPHALIC





- Eye Exam


Eye Exam: EOMI, Normal appearance, PERRL


Pupil Exam: NORMAL ACCOMODATION





- ENT Exam


ENT Exam: Mucous Membranes Moist, Normal Exam





- Neck Exam


Neck exam: Full Rom, Normal Inspection





- Respiratory Exam


Respiratory Exam: Clear to PA & Lateral, NORMAL BREATHING PATTERN.  absent: 

Rales, Rhonchi, Wheezes





- Cardiovascular Exam


Cardiovascular Exam: REGULAR RHYTHM, RRR, +S1, +S2.  absent: JVD





- GI/Abdominal Exam


GI & Abdominal Exam: Normal Bowel Sounds, Soft, Unremarkable.  absent: Distended

, Guarding, Rebound, Tenderness





- Rectal Exam


Rectal Exam: Deferred





- Extremities Exam


Extremities exam: normal capillary refill, normal inspection, pedal pulses 

present





- Back Exam


Back exam: NORMAL INSPECTION





- Neurological Exam


Neurological exam: Alert, CN II-XII Intact, Oriented x3, Reflexes Normal





- Psychiatric Exam


Psychiatric exam: Normal Affect, Normal Mood





- Skin


Skin Exam: Dry, Intact, Normal Color, Warm





Discharge Plan





- Discharge Medications


Prescriptions: 


Acetaminophen/Butalbital/Caf [Fioricet] 1 tab PO Q4 PRN #30 tab


 PRN Reason: Migraine Headache





- Follow Up Plan


Condition: STABLE


Disposition: HOME/ ROUTINE


Patient education suggested?: Yes


Instructions:  Chest Pain (DC), Migraine Headache (DC), Tension Headache (DC)


Referrals: 


Luther Ge MD [Staff Provider] -

## 2017-10-20 NOTE — CARD
--------------- APPROVED REPORT --------------





EKG Measurement

Heart [id number]

[id number] [id number]

[id number]

[id number]

[id number]



<Conclusion>

Normal sinus rhythm

Possible Left atrial enlargement

ST & T wave abnormality, consider lateral ischemia

Abnormal ECG

## 2018-03-17 ENCOUNTER — HOSPITAL ENCOUNTER (OUTPATIENT)
Dept: HOSPITAL 14 - H.ER | Age: 73
Setting detail: OBSERVATION
LOS: 1 days | Discharge: HOME | End: 2018-03-18
Attending: INTERNAL MEDICINE | Admitting: INTERNAL MEDICINE
Payer: MEDICARE

## 2018-03-17 VITALS — BODY MASS INDEX: 37.8 KG/M2

## 2018-03-17 DIAGNOSIS — I25.110: Primary | ICD-10-CM

## 2018-03-17 DIAGNOSIS — Z79.899: ICD-10-CM

## 2018-03-17 DIAGNOSIS — Z95.5: ICD-10-CM

## 2018-03-17 DIAGNOSIS — E78.5: ICD-10-CM

## 2018-03-17 DIAGNOSIS — Z87.891: ICD-10-CM

## 2018-03-17 DIAGNOSIS — Z79.82: ICD-10-CM

## 2018-03-17 DIAGNOSIS — E78.00: ICD-10-CM

## 2018-03-17 DIAGNOSIS — Z83.3: ICD-10-CM

## 2018-03-17 DIAGNOSIS — I10: ICD-10-CM

## 2018-03-17 DIAGNOSIS — Z79.02: ICD-10-CM

## 2018-03-17 LAB
ALBUMIN SERPL-MCNC: 3.9 G/DL (ref 3.5–5)
ALBUMIN/GLOB SERPL: 1 {RATIO} (ref 1–2.1)
ALT SERPL-CCNC: 22 U/L (ref 9–52)
APTT BLD: 28.8 SECONDS (ref 25.6–37.1)
AST SERPL-CCNC: 34 U/L (ref 14–36)
BASOPHILS # BLD AUTO: 0 K/UL (ref 0–0.2)
BASOPHILS NFR BLD: 0.5 % (ref 0–2)
BUN SERPL-MCNC: 25 MG/DL (ref 7–17)
CALCIUM SERPL-MCNC: 9.3 MG/DL (ref 8.4–10.2)
EOSINOPHIL # BLD AUTO: 0.1 K/UL (ref 0–0.7)
EOSINOPHIL NFR BLD: 1.5 % (ref 0–4)
ERYTHROCYTE [DISTWIDTH] IN BLOOD BY AUTOMATED COUNT: 16.8 % (ref 11.5–14.5)
GFR NON-AFRICAN AMERICAN: > 60
HGB BLD-MCNC: 13.8 G/DL (ref 12–16)
INR PPP: 1 (ref 0.9–1.2)
LYMPHOCYTES # BLD AUTO: 2 K/UL (ref 1–4.3)
LYMPHOCYTES NFR BLD AUTO: 29.3 % (ref 20–40)
MCH RBC QN AUTO: 26.9 PG (ref 27–31)
MCHC RBC AUTO-ENTMCNC: 32.8 G/DL (ref 33–37)
MCV RBC AUTO: 81.8 FL (ref 81–99)
MONOCYTES # BLD: 0.5 K/UL (ref 0–0.8)
MONOCYTES NFR BLD: 8 % (ref 0–10)
NEUTROPHILS # BLD: 4.1 K/UL (ref 1.8–7)
NEUTROPHILS NFR BLD AUTO: 60.7 % (ref 50–75)
NRBC BLD AUTO-RTO: 0.2 % (ref 0–0)
PLATELET # BLD: 251 K/UL (ref 130–400)
PMV BLD AUTO: 9.1 FL (ref 7.2–11.7)
PROTHROMBIN TIME: 10.9 SECONDS (ref 9.8–13.1)
RBC # BLD AUTO: 5.15 MIL/UL (ref 3.8–5.2)
WBC # BLD AUTO: 6.7 K/UL (ref 4.8–10.8)

## 2018-03-17 PROCEDURE — 96372 THER/PROPH/DIAG INJ SC/IM: CPT

## 2018-03-17 PROCEDURE — 70450 CT HEAD/BRAIN W/O DYE: CPT

## 2018-03-17 PROCEDURE — 85027 COMPLETE CBC AUTOMATED: CPT

## 2018-03-17 PROCEDURE — 84443 ASSAY THYROID STIM HORMONE: CPT

## 2018-03-17 PROCEDURE — 99285 EMERGENCY DEPT VISIT HI MDM: CPT

## 2018-03-17 PROCEDURE — 93005 ELECTROCARDIOGRAM TRACING: CPT

## 2018-03-17 PROCEDURE — 84484 ASSAY OF TROPONIN QUANT: CPT

## 2018-03-17 PROCEDURE — 85610 PROTHROMBIN TIME: CPT

## 2018-03-17 PROCEDURE — 85025 COMPLETE CBC W/AUTO DIFF WBC: CPT

## 2018-03-17 PROCEDURE — 85730 THROMBOPLASTIN TIME PARTIAL: CPT

## 2018-03-17 PROCEDURE — 80053 COMPREHEN METABOLIC PANEL: CPT

## 2018-03-17 PROCEDURE — 80061 LIPID PANEL: CPT

## 2018-03-17 PROCEDURE — 71045 X-RAY EXAM CHEST 1 VIEW: CPT

## 2018-03-17 PROCEDURE — 80048 BASIC METABOLIC PNL TOTAL CA: CPT

## 2018-03-17 NOTE — CP.PCM.CON
History of Present Illness





- History of Present Illness


History of Present Illness: 





                                                               This 72-year-old 

female came to the emergency room reporting severe retrosternal discomfort 

which woke her up from sleep early this morning. The pain lasted approximately 

an hour and spontaneously resolved. It was accompanied by a sense of nausea but 

there was no perspiration or vomiting. The pain did not radiate down her arm or 

into her jaw. The patient reports being quite active and has never experienced 

any effort related chest pain. 80 years back she had required multiple coronary 

stents following a bout of chest pain. She denies any history of myocardial 

infarction or symptoms of congestive cardiac failure. She is not a diabetic. 

She quit smoking approximately 5 years back. She is a hypertensive who takes 

multiple antihypertensives medications. There is a strong family history of 

diabetes.


Physical examination shows an elderly female who is lying flat in bed free of 

any discomfort at this point. She has a heart rate of 70 bpm and regular and a 

blood pressure of 138/74 mmHg. Her jugular venous pressure was not elevated and 

there was no edema or [place] immediately. Her pedal pulses were well felt. Her 

extremities were warm and the nailbeds were pink. There was no central or 

peripheral cyanosis. The apex was not palpable the first and second heart 

sounds were normal there was no murmur or gallop there were no rales. There was 

no precordial tenderness. Abdomen was soft liver and spleen are not palpable. 

Her electrocardiogram shows sinus rhythm with ST depression and T-wave 

inversion in leads V4 V5 and V6 which was not evident on earlier 

electrocardiogram of May 2017. The first set of cardiac enzymes was negative 

for any myocyte injury. The rest of her labs were noted.





Impression: Chest pain rule out acute coronary syndrome. History of coronary 

artery disease with multiple coronary stent placement 8 years back.


The patient will be continued on aspirin and will be taking Plavix as well. 

Follow-up electrocardiogram and cardiac enzymes have been requested. The 

patient gives a vague history of allergy to Lovenox in the form of redness at 

the site of injection. She is being given heparin subcutaneously. At this point 

she is chest pain free and hemodynamically stable.





Past Patient History





- Infectious Disease


Hx of Infectious Diseases: None





- Past Medical History & Family History


Past Medical History?: Yes





- Past Social History


Smoking Status: Never Smoked





- CARDIAC


Hx Hypercholesterolemia: Yes


Hx Hypertension: Yes





- PULMONARY


Hx Respiratory Disorders: No





- NEUROLOGICAL


Hx Neurological Disorder: No





- HEENT


Hx HEENT Problems: No





- RENAL


Hx Chronic Kidney Disease: No





- ENDOCRINE/METABOLIC


Hx Endocrine Disorders: No





- MUSCULOSKELETAL/RHEUMATOLOGICAL


Hx Falls: No





- GASTROINTESTINAL


Hx Gastrointestinal Disorders: No





- GENITOURINARY/GYNECOLOGICAL


Hx Genitourinary Disorders: No





- PSYCHIATRIC


Hx Psychophysiologic Disorder: No


Hx Substance Use: No





- SURGICAL HISTORY


Hx Coronary Stent: Yes





- ANESTHESIA


Hx Anesthesia: Yes


Hx Anesthesia Reactions: No





Meds


Allergies/Adverse Reactions: 


 Allergies











Allergy/AdvReac Type Severity Reaction Status Date / Time


 


enoxaparin sodium Allergy  REDNESS Verified 10/19/17 08:34





[From Lovenox]     














- Medications


Medications: 


 Current Medications





Acetaminophen (Tylenol 325mg Tab)  650 mg PO Q6 PRN


   PRN Reason: Fever >100.4 F


Acetaminophen/Butalbital/Caffeine (Fioricet)  1 tab PO Q4 PRN


   PRN Reason: Migraine headache


Aspirin (Ecotrin)  81 mg PO DAILY [facility]


Carvedilol (Coreg)  6.25 mg PO Q12 [facility]


Clopidogrel Bisulfate (Plavix)  75 mg PO DAILY [facility]


Heparin Sodium (Porcine) (Heparin)  5,000 units SC Q12 [facility]


   PRN Reason: Protocol


Metoclopramide HCl (Reglan)  10 mg IVP Q6 PRN


   PRN Reason: Nausea/Vomiting


Pantoprazole Sodium (Protonix Ec Tab)  40 mg PO DAILY [facility]











Results





- Vital Signs


Recent Vital Signs: 


 Last Vital Signs











Temp  97.7 F   03/17/18 09:05


 


Pulse  85   03/17/18 12:37


 


Resp  19   03/17/18 12:06


 


BP  115/65   03/17/18 14:05


 


Pulse Ox  96   03/17/18 12:37














- Labs


Result Diagrams: 


 03/17/18 09:40





 03/17/18 09:40


Labs: 


 Laboratory Results - last 24 hr











  03/17/18 03/17/18 03/17/18





  09:40 09:40 09:40


 


WBC  6.7  


 


RBC  5.15  


 


Hgb  13.8  


 


Hct  42.1  


 


MCV  81.8  


 


MCH  26.9 L  


 


MCHC  32.8 L  


 


RDW  16.8 H  


 


Plt Count  251  


 


MPV  9.1  


 


Neut % (Auto)  60.7  


 


Lymph % (Auto)  29.3  


 


Mono % (Auto)  8.0  


 


Eos % (Auto)  1.5  


 


Baso % (Auto)  0.5  


 


Neut # (Auto)  4.1  


 


Lymph # (Auto)  2.0  


 


Mono # (Auto)  0.5  


 


Eos # (Auto)  0.1  


 


Baso # (Auto)  0.0  


 


PT   10.9 


 


INR   1.0 


 


APTT   28.8 


 


Sodium    143


 


Potassium    5.1 H


 


Chloride    103


 


Carbon Dioxide    25


 


Anion Gap    20


 


BUN    25 H


 


Creatinine    0.8


 


Est GFR ( Amer)    > 60


 


Est GFR (Non-Af Amer)    > 60


 


Random Glucose    137 H


 


Calcium    9.3


 


Total Bilirubin    0.9


 


AST    34


 


ALT    22


 


Alkaline Phosphatase    78


 


Troponin I    0.0130


 


Total Protein    7.8


 


Albumin    3.9


 


Globulin    3.9


 


Albumin/Globulin Ratio    1.0

## 2018-03-17 NOTE — ED PDOC
HPI: Chest Pain


Time Seen by Provider: 18 09:05


Chief Complaint (Nursing): Chest Pain


Chief Complaint (Provider): Chest pain


History Per: Patient


History/Exam Limitations: no limitations


Onset/Duration Of Symptoms: Hrs


Current Symptoms Are (Timing): Gone Now


Quality: "Pain"


Additional Complaint(s): 


73yo female with history of hypertension, CAD with stents several days ago, 

presents to ED with complaints of chest pain which woke her up from her sleep 

at 6am this morning. Patient states the chest pain was intermittently present, 

and lasted upwards of 15 minutes at a time. She states currently, the pain is 

not present. Patient was medicated BY EMS with Aspirin 81 mg x4 and Nitro. 0.4 

mg PO x1 en route to the ED. She denies any lightheadedness, nausea, vomiting, 

abdominal pain. She has no other medical complaints. 





PCP: Dr. Ge





Past Medical History


Reviewed: Historical Data, Nursing Documentation, Vital Signs


Vital Signs: 


 Last Vital Signs











Temp  98.3 F   18 16:00


 


Pulse  77   18 16:00


 


Resp  20   18 16:00


 


BP  124/64   18 16:00


 


Pulse Ox  96   18 16:00














- Medical History


PMH: CAD, HTN, Hypercholesterolemia


   Denies: Chronic Kidney Disease





- Surgical History


Surgical History: Coronary Stent, 





- Family History


Family History: States: Unknown Family Hx





- Social History


Ex-Smoker (has not smoked in the last 12 months): Yes





- Home Medications


Home Medications: 


 Ambulatory Orders











 Medication  Instructions  Recorded


 


Clopidogrel [Plavix] 75 mg PO DAILY 17


 


Valsartan/Hydrochlorothiazide 1 tab PO DAILY 17





[Diovan Hct 320-25 mg Tablet]  


 


Aspirin [Ecotrin] 81 mg PO DAILY 10/19/17


 


Hydralazine HCl 100 mg PO Q12H 10/19/17


 


Labetalol Hydrochloride [Normodyne] 300 mg PO Q12H 10/19/17


 


Omeprazole 40 mg PO DAILY 10/19/17


 


amLODIPine [Norvasc] 5 mg PO DAILY 10/19/17


 


Acetaminophen/Butalbital/Caf 1 tab PO Q4 PRN #30 tab 10/20/17





[Fioricet]  














- Allergies


Allergies/Adverse Reactions: 


 Allergies











Allergy/AdvReac Type Severity Reaction Status Date / Time


 


enoxaparin sodium Allergy  REDNESS Verified 10/19/17 08:34





[From Lovenox]     














Review of Systems


ROS Statement: Except As Marked, All Systems Reviewed And Found Negative


Constitutional: Negative for: Fever, Chills


Cardiovascular: Positive for: Chest Pain.  Negative for: Light Headedness


Respiratory: Negative for: Shortness of Breath


Gastrointestinal: Negative for: Nausea, Vomiting, Abdominal Pain





Physical Exam





- Reviewed


Nursing Documentation Reviewed: Yes


Vital Signs Reviewed: Yes





- Physical Exam


Appears: Positive for: Non-toxic, No Acute Distress


Head Exam: Positive for: ATRAUMATIC, NORMAL INSPECTION, NORMOCEPHALIC


Skin: Positive for: Normal Color


Eye Exam: Positive for: Normal appearance


Neck: Positive for: Supple


Cardiovascular/Chest: Positive for: Regular Rate, Rhythm, Chest Non Tender


Respiratory: Positive for: Normal Breath Sounds


Gastrointestinal/Abdominal: Positive for: Normal Exam, Soft.  Negative for: 

Tenderness


Extremity: Positive for: Normal ROM.  Negative for: Pedal Edema


Neurologic/Psych: Positive for: Alert, Oriented.  Negative for: Motor/Sensory 

Deficits





- Laboratory Results


Result Diagrams: 


 18 09:40





 18 09:40





- ECG


ECG: Positive for: Interpreted By Me, Viewed By Me


ECG Rhythm: Positive for: Sinus Rhythm


Interpretation Of Abn EKG: Flipped T's on 3, AVF and V3-v6.  ST Depression V4-

V6.  Inferior end lateral ischemia


Interpretation Of ECG: 


Prior records reviewed and current EKG compared to EKG on 10/19/17


Patient with new ST Depression in V4-V6, flipped T's in 3, AVF and V3-V5 


Rate: 85


O2 Sat by Pulse Oximetry: 96 (RA)


Pulse Ox Interpretation: Normal





Medical Decision Making


Medical Decision Making: 


Impression: Substernal chest pain


Plan:


-- Labs


-- Chest x-ray


-- Nitro paste 2%





Prior records reviewed and patient was placed on a regimen of plavix and 

lipitor which she stopped taking of her own volition.





Time: 1002


Chest x-ray HISTORY:


Sepsis Patient  





COMPARISON:


10/19/2017 





FINDINGS:





LUNGS:


No active pulmonary disease.





PLEURA:


No significant pleural effusion identified, no pneumothorax apparent.





CARDIOVASCULAR:


No significant interval change. Heart and aorta are stable in size.





OSSEOUS STRUCTURES:


No significant abnormalities.





VISUALIZED UPPER ABDOMEN:


Normal.





OTHER FINDINGS:


None.





IMPRESSION:


No active disease.





Time: 1047


Provider informed by nurse that patient fell earlier this morning and is unsure 

if she hit her head. CT Head ordered.


Case discussed with Dr. ANA Perry, hospitalist on call, who will come and evaluate 

patient at bedside. Patient admitted to [facility] under Dr. Perry





Time: 1237


CT Head FINDINGS:





HEMORRHAGE:


No intracranial hemorrhage. 





BRAIN:


No mass effect or edema.  Very mild cerebral atrophy is noted. Stable very mild 

small vessel changes are noted in the white matter tracts. No new cortical 

effacement is seen.





VENTRICLES:


Unremarkable. No hydrocephalus. 





CALVARIUM:


Unremarkable.





PARANASAL SINUSES:


Unremarkable as visualized. No significant inflammatory changes.





MASTOID AIR CELLS:


Unremarkable as visualized. No inflammatory changes.





OTHER FINDINGS:


None.





IMPRESSION:


No evidence of intracranial hemorrhage.  Stable mild small vessel changes in 

the white matter tracts.  No interval change.








--------------------------------------------------------------------------------

------------------------------------





Scribe Attestation:


Documented by Radha Rowan acting as a scribe for Mary Cox MD. 





Provider Attestation:


All medical record entries made by the Scribe were at my direction and 

personally dictated by me. I have reviewed the chart and agree that the record 

accurately reflects my personal performance of the history, physical exam, 

medical decision making, and the department course for this patient. I have 

also personally directed, reviewed, and agree with the discharge instructions 

and disposition.








Disposition





- Clinical Impression


Clinical Impression: 


 Unstable angina








- Patient ED Disposition


Is Patient to be Admitted: Yes


Discussed With : Mamta Perry





- Disposition


Disposition Time: 18:38


Condition: SERIOUS

## 2018-03-17 NOTE — RAD
HISTORY:

Sepsis Patient  



COMPARISON:

10/19/2017 



FINDINGS:



LUNGS:

No active pulmonary disease.



PLEURA:

No significant pleural effusion identified, no pneumothorax apparent.



CARDIOVASCULAR:

No significant interval change. Heart and aorta are stable in size.



OSSEOUS STRUCTURES:

No significant abnormalities.



VISUALIZED UPPER ABDOMEN:

Normal.



OTHER FINDINGS:

None.



IMPRESSION:

No active disease.

## 2018-03-17 NOTE — CP.PCM.HP
History of Present Illness





- History of Present Illness


History of Present Illness: 





CHIEF COMPLAINT: Chest Pain





HPI 72F PMH HTN, HLD, CAD with stent x3 presents to [facility] ER with a 12 hour 

history of acute, pressure like, severe chest pain that lasted appx one hour 

which woke her from sleep, associated with nausea without emesis, no 

palpitations, diaphoresis, or dyspnea. Patient states she has also had a severe 

frontal headache that has lasted about 12 hours. Currently chest pain free. HD 

stable NAD.    





In ER, troponin neg x1 (0.013), EKG changes compared to prior; T wave 

inversions inferior leads, prolonged QTc, will place under OBS for concern for 

possible ACS. ASA given, trending enzymes.





ROS: per HPI all other systems reviewed and negative





PMHx: HTN, HLD, CAD with Cardiac Catheterization 5 years ago with 3 stents 


PSHx: Cardiac Catheterization


ALL: Unknown food allergy (possibly fish)


Medication: as below


Social Hx: Retired [employer] , Lives with GrandDaughter, (+) Tobacco 

4 cig/day for 40 years quit 5 years ago, NO alcohol, NO drugs


Family Hx: Mom (Asthma, DM2), Dad (Alcoholism), Sister (DM 2, HLD)





Vitals reviewed and stable


Constitutional- cooperative, awake, alert.


Head- NCAT, PERRL


Eye- PERRL, normal accommodation


ENT- normal exam, MMM.


Neck- normal inspection, supple, no JVD


Respiratory- CTAB, no wheezes rales rhonchi


Cardiovascular- RRR, +S1, +S2 no MRG


GI/Abdominal- normal bowel sounds, soft


Skin- warm, dry


Extremities Exam- normal capillary refill, normal inspection


Neurological Exam- alert, oriented


Psych- normal mood, normal affect











Present on Admission





- Present on Admission


Any Indicators Present on Admission: No





Past Patient History





- Infectious Disease


Hx of Infectious Diseases: None





- Past Medical History & Family History


Past Medical History?: Yes





- Past Social History


Smoking Status: Never Smoked





- CARDIAC


Hx Hypercholesterolemia: Yes


Hx Hypertension: Yes





- PULMONARY


Hx Respiratory Disorders: No





- NEUROLOGICAL


Hx Neurological Disorder: No





- HEENT


Hx HEENT Problems: No





- RENAL


Hx Chronic Kidney Disease: No





- ENDOCRINE/METABOLIC


Hx Endocrine Disorders: No





- MUSCULOSKELETAL/RHEUMATOLOGICAL


Hx Falls: No





- GASTROINTESTINAL


Hx Gastrointestinal Disorders: No





- GENITOURINARY/GYNECOLOGICAL


Hx Genitourinary Disorders: No





- PSYCHIATRIC


Hx Psychophysiologic Disorder: No


Hx Substance Use: No





- SURGICAL HISTORY


Hx Coronary Stent: Yes





- ANESTHESIA


Hx Anesthesia: Yes


Hx Anesthesia Reactions: No





Meds


Allergies/Adverse Reactions: 


 Allergies











Allergy/AdvReac Type Severity Reaction Status Date / Time


 


enoxaparin sodium Allergy  REDNESS Verified 10/19/17 08:34





[From Lovenox]     














Physical Exam





- Constitutional


Additional comments: 





Vitals Reviewed


GEN: WDWN, ALERT, COOPERATIVE


HEENT: NCAT, PERRL, EOMI


HEART: RRR, +S1S2, NO MRG


LUNG: CTAB, NO WRR


ABD: SOFT, NT, ND, NO HSM, NO MASSES


EXT: NORMAL PEDAL PULSES, GOOD CAPILLARY REFILL


NEURO: AAOX3, STRENGTH EQUAL BILATERAL UPPER AND LOWER EXTREMITIES


SKIN: WARM, DRY


PSYCH: NORMAL MOOD, NORMAL AFFECT





Results





- Vital Signs


Recent Vital Signs: 





 Last Vital Signs











Temp  97.7 F   03/17/18 09:05


 


Pulse  85   03/17/18 12:37


 


Resp  19   03/17/18 12:06


 


BP  98/49 L  03/17/18 12:06


 


Pulse Ox  96   03/17/18 12:37














- Labs


Result Diagrams: 


 03/17/18 09:40





 03/17/18 09:40


Labs: 





 Laboratory Results - last 24 hr











  03/17/18 03/17/18 03/17/18





  09:40 09:40 09:40


 


WBC  6.7  


 


RBC  5.15  


 


Hgb  13.8  


 


Hct  42.1  


 


MCV  81.8  


 


MCH  26.9 L  


 


MCHC  32.8 L  


 


RDW  16.8 H  


 


Plt Count  251  


 


MPV  9.1  


 


Neut % (Auto)  60.7  


 


Lymph % (Auto)  29.3  


 


Mono % (Auto)  8.0  


 


Eos % (Auto)  1.5  


 


Baso % (Auto)  0.5  


 


Neut # (Auto)  4.1  


 


Lymph # (Auto)  2.0  


 


Mono # (Auto)  0.5  


 


Eos # (Auto)  0.1  


 


Baso # (Auto)  0.0  


 


PT   10.9 


 


INR   1.0 


 


APTT   28.8 


 


Sodium    143


 


Potassium    5.1 H


 


Chloride    103


 


Carbon Dioxide    25


 


Anion Gap    20


 


BUN    25 H


 


Creatinine    0.8


 


Est GFR ( Amer)    > 60


 


Est GFR (Non-Af Amer)    > 60


 


Random Glucose    137 H


 


Calcium    9.3


 


Total Bilirubin    0.9


 


AST    34


 


ALT    22


 


Alkaline Phosphatase    78


 


Troponin I    0.0130


 


Total Protein    7.8


 


Albumin    3.9


 


Globulin    3.9


 


Albumin/Globulin Ratio    1.0














Assessment & Plan





- Assessment and Plan (Free Text)


Plan: 





72F PMH HTN, HLD, CAD with stent x3 presents to [facility] ER with a 12 hour history 

of acute, pressure like, severe chest pain that lasted appx one hour which woke 

her from sleep, associated with nausea without emesis, no palpitations, 

diaphoresis, or dyspnea. Patient states she has also had a severe frontal 

headache that has lasted about 12 hours. Currently chest pain free. HD stable 

NAD.    





In ER, troponin neg x1 (0.013), EKG changes compared to prior; T wave 

inversions inferior leads, prolonged QTc, will place under OBS for concern for 

possible ACS. ASA given, trending enzymes.








Chest Pain


CAD


HTN


HLD


   - concern for ACS, hx CAD with PCIx3, severe pressure-like chest pain, woke 

pt from sleep, EKG changes, neg troponin


   - initial troponin negative, trend x2 q6 hours


   - EKG T wave changes in inferior leads, with QTc prolongation, avoid QT 

prolongation agents


   - ASA, plavix, BB, pain control


   - Cardiology consult: Dr. JOSE France, discussed


   


Headaches, Hx


   - continue fioricet





VTE PPx


   heparin 5000u sq q8

## 2018-03-17 NOTE — CT
PROCEDURE:  CT HEAD WITHOUT CONTRAST.



HISTORY:

trauma, loss of consciousness and syncope 



COMPARISON:

10/19/2017 



TECHNIQUE:

Axial computed tomography images were obtained through the head/brain 

without intravenous contrast.  



Radiation dose:



Total exam DLP = 1097 mGy-cm.



This CT exam was performed using one or more of the following dose 

reduction techniques: Automated exposure control, adjustment of the 

mA and/or kV according to patient size, and/or use of iterative 

reconstruction technique.



FINDINGS:



HEMORRHAGE:

No intracranial hemorrhage. 



BRAIN:

No mass effect or edema.  Very mild cerebral atrophy is noted. Stable 

very mild small vessel changes are noted in the white matter tracts. 

No new cortical effacement is seen.



VENTRICLES:

Unremarkable. No hydrocephalus. 



CALVARIUM:

Unremarkable.



PARANASAL SINUSES:

Unremarkable as visualized. No significant inflammatory changes.



MASTOID AIR CELLS:

Unremarkable as visualized. No inflammatory changes.



OTHER FINDINGS:

None.



IMPRESSION:

No evidence of intracranial hemorrhage.  Stable mild small vessel 

changes in the white matter tracts.  No interval change.

## 2018-03-18 ENCOUNTER — HOSPITAL ENCOUNTER (EMERGENCY)
Dept: HOSPITAL 14 - H.ER | Age: 73
Discharge: HOME | End: 2018-03-18
Payer: MEDICARE

## 2018-03-18 VITALS — TEMPERATURE: 97.9 F | SYSTOLIC BLOOD PRESSURE: 128 MMHG | DIASTOLIC BLOOD PRESSURE: 79 MMHG | HEART RATE: 78 BPM

## 2018-03-18 VITALS
SYSTOLIC BLOOD PRESSURE: 110 MMHG | DIASTOLIC BLOOD PRESSURE: 68 MMHG | OXYGEN SATURATION: 96 % | HEART RATE: 70 BPM | TEMPERATURE: 98.4 F

## 2018-03-18 VITALS — BODY MASS INDEX: 37.8 KG/M2

## 2018-03-18 VITALS — OXYGEN SATURATION: 99 % | RESPIRATION RATE: 14 BRPM

## 2018-03-18 VITALS — RESPIRATION RATE: 18 BRPM

## 2018-03-18 DIAGNOSIS — Z95.5: ICD-10-CM

## 2018-03-18 DIAGNOSIS — I25.10: ICD-10-CM

## 2018-03-18 DIAGNOSIS — Z87.891: ICD-10-CM

## 2018-03-18 DIAGNOSIS — T78.40XA: Primary | ICD-10-CM

## 2018-03-18 DIAGNOSIS — Z79.82: ICD-10-CM

## 2018-03-18 DIAGNOSIS — E78.5: ICD-10-CM

## 2018-03-18 DIAGNOSIS — I10: ICD-10-CM

## 2018-03-18 LAB
BUN SERPL-MCNC: 25 MG/DL (ref 7–17)
CALCIUM SERPL-MCNC: 9.1 MG/DL (ref 8.4–10.2)
ERYTHROCYTE [DISTWIDTH] IN BLOOD BY AUTOMATED COUNT: 16.5 % (ref 11.5–14.5)
GFR NON-AFRICAN AMERICAN: > 60
HDLC SERPL-MCNC: 33 MG/DL (ref 30–70)
HGB BLD-MCNC: 12.6 G/DL (ref 12–16)
LDLC SERPL-MCNC: 113 MG/DL (ref 0–129)
MCH RBC QN AUTO: 26.6 PG (ref 27–31)
MCHC RBC AUTO-ENTMCNC: 31.9 G/DL (ref 33–37)
MCV RBC AUTO: 83.3 FL (ref 81–99)
PLATELET # BLD: 220 K/UL (ref 130–400)
RBC # BLD AUTO: 4.75 MIL/UL (ref 3.8–5.2)
WBC # BLD AUTO: 5.5 K/UL (ref 4.8–10.8)

## 2018-03-18 PROCEDURE — 96374 THER/PROPH/DIAG INJ IV PUSH: CPT

## 2018-03-18 PROCEDURE — 96375 TX/PRO/DX INJ NEW DRUG ADDON: CPT

## 2018-03-18 PROCEDURE — 99283 EMERGENCY DEPT VISIT LOW MDM: CPT

## 2018-03-18 NOTE — ED PDOC
Arrival/HPI





- General


Chief Complaint: Allergic Reaction


Time Seen by Provider: 03/18/18 14:55


Historian: Patient





- History of Present Illness


Narrative History of Present Illness (Text): 


03/18/18 15:44


Danii Jaimes is a 72 year old female, whose past medical history of Hypertension

, Hyperlipidemia, CAD with stents x3, who presents to the ed complaining of 

swollen lips and sore throat. Patient notes she was discharged from the 

hospital today. Patient was given a meal containing fish and a medication. When 

arriving home, the patient noticed her lips had swelled and her throat got 

sore. Patient is unaware of any new medication she was given at the hospital. 

Patient denies any fever, chest pain cough, nausea, vomiting, diarrhea, or any 

other complaints. 


Symptom Onset: Sudden


Symptom Course: Unchanged


Activities at Onset: Light


Context: Home





Past Medical History





- Provider Review


Nursing Documentation Reviewed: Yes





- Infectious Disease


Hx of Infectious Diseases: None





- Reproductive


Menopause: No





- Cardiac


Hx Cardiac Disorders: Yes


Hx Coronary Artery Disease: Yes


Hx Hypertension: Yes





- Pulmonary


Hx Respiratory Disorders: No





- Neurological


Hx Neurological Disorder: Yes


Hx Migraine: Yes





- HEENT


Hx HEENT Disorder: No





- Renal


Hx Renal Disorder: Yes





- Endocrine/Metabolic


Hx Endocrine Disorders: No





- Hematological/Oncological


Hx Blood Disorders: No


Hx AIDS: No





- Integumentary


Hx Dermatological Disorder: No





- Musculoskeletal/Rheumatological


Hx Musculoskeletal Disorders: No


Hx Falls: No





- Gastrointestinal


Hx Gastrointestinal Disorders: No





- Genitourinary/Gynecological


Hx Genitourinary Disorders: No





- Psychiatric


Hx Psychophysiologic Disorder: No


Hx Substance Use: No





- Surgical History


Hx Coronary Stent: Yes





- Anesthesia


Hx Anesthesia: Yes


Hx Anesthesia Reactions: No


Hx Malignant Hyperthermia: No





Family/Social History





- Physician Review


Nursing Documentation Reviewed: Yes


Family/Social History: Unknown Family HX


Smoking Status: Former Smoker


Hx Alcohol Use: No


Hx Substance Use: No





Allergies/Home Meds


Allergies/Adverse Reactions: 


Allergies





enoxaparin sodium [From Lovenox] Allergy (Verified 03/18/18 14:22)


 REDNESS








Home Medications: 


 Home Meds











 Medication  Instructions  Recorded  Confirmed


 


Clopidogrel [Plavix] 75 mg PO DAILY 04/17/17 03/17/18


 


Aspirin [Ecotrin] 81 mg PO DAILY 10/19/17 10/19/17


 


Hydralazine HCl 100 mg PO Q12H 10/19/17 03/17/18


 


Omeprazole 40 mg PO DAILY 10/19/17 03/17/18














Review of Systems





- Physician Review


All systems were reviewed & negative as marked: Yes





- Review of Systems


Constitutional: Normal


Eyes: Normal


ENT: Sore Throat, Other (swollen lips)


Respiratory: Normal.  absent: SOB, Cough


Cardiovascular: Normal.  absent: Chest Pain


Gastrointestinal: Normal.  absent: Abdominal Pain, Diarrhea, Nausea, Vomiting


Genitourinary Female: Normal.  absent: Dysuria, Frequency


Musculoskeletal: Normal.  absent: Back Pain, Neck Pain


Skin: Normal.  absent: Rash


Neurological: Normal.  absent: Headache, Dizziness


Endocrine: Normal


Hemo/Lymphatic: Normal


Psychiatric: Normal





Physical Exam


Vital Signs Reviewed: Yes


Vital Signs











  Temp Pulse Resp BP Pulse Ox


 


 03/18/18 16:12   70  14  135/82  99


 


 03/18/18 14:22  97.8 F  88  20  161/82 H  98











Temperature: Afebrile


Blood Pressure: Normal


Pulse: Regular


Respiratory Rate: Normal


Appearance: Positive for: Well-Appearing, Non-Toxic, Comfortable


Pain Distress: None


Mental Status: Positive for: Alert and Oriented X 3





- Systems Exam


Head: Present: Atraumatic, Normocephalic


Pupils: Present: PERRL


Extroacular Muscles: Present: EOMI


Conjunctiva: Present: Normal


Mouth: Present: Moist Mucous Membranes.  No: Normal Lips (+angiodema)


Neck: Present: Normal Range of Motion.  No: Meningeal Signs, MIDLINE TENDERNESS

, JVD


Respiratory/Chest: Present: Clear to Auscultation, Good Air Exchange.  No: 

Respiratory Distress, Accessory Muscle Use


Cardiovascular: Present: Regular Rate and Rhythm, Normal S1, S2.  No: Murmurs


Abdomen: Present: Normal Bowel Sounds.  No: Tenderness, Distention, Peritoneal 

Signs


Back: Present: Normal Inspection


Upper Extremity: Present: Normal Inspection.  No: Cyanosis, Edema


Lower Extremity: Present: Normal Inspection.  No: Edema


Neurological: Present: GCS=15, CN II-XII Intact, Speech Normal


Skin: Present: Warm, Dry, Normal Color.  No: Rashes


Psychiatric: Present: Alert, Oriented x 3, Normal Insight, Normal Concentration





Medical Decision Making


ED Course and Treatment: 


03/18/18 15:52


Impression: 


72 year old female presents to the ed complaining of swollen lips and a sore 

throat after being d/c from the hospital this morning.





Plan:


-- Benadryl


-- Pepcid


-- SOLU-Medril


-- Reasses and Disposition





Progress Notes: 








d/w Dr. Perry.  patient was not given any new medications today.  Patient states 

symptoms began immediately after eating fish.


Patient re-examined.  Noted improvement of lip swelling.








Documented by Penny Gannon acting as a scribe for Milena Hamilton MD. 














03/18/18 19:04








- Medication Orders


Current Medication Orders: 











Discontinued Medications





Diphenhydramine HCl (Benadryl)  50 mg IVP STAT STA


   Stop: 03/18/18 14:56


   Last Admin: 03/18/18 15:25  Dose: 50 mg





IVP Administration


 Document     03/18/18 15:25  WALTER  (Rec: 03/18/18 15:46  WALTER  [id number])


     Charges for Administration


      # of IVP Administrations                   1





Famotidine (Pepcid)  20 mg IVP STAT STA


   Stop: 03/18/18 14:57


   Last Admin: 03/18/18 15:15  Dose: 20 mg





IVP Administration


 Document     03/18/18 15:15  WALTER  (Rec: 03/18/18 15:46  WALTER  [id number])


     Charges for Administration


      # of IVP Administrations                   1





Methylprednisolone (Solu-Medrol)  125 mg IVP ONCE ONE


   Stop: 03/18/18 14:55


   Last Admin: 03/18/18 15:40  Dose: 125 mg





IVP Administration


 Document     03/18/18 15:40  WALTER  (Rec: 03/18/18 15:46  WALTER  [id number])


     Charges for Administration


      # of IVP Administrations                   1














Disposition/Present on Arrival





- Present on Arrival


Any Indicators Present on Arrival: No


History of DVT/PE: No


History of Uncontrolled Diabetes: No


Urinary Catheter: No





- Disposition


Have Diagnosis and Disposition been Completed?: Yes


Diagnosis: 


 Allergic reaction





Disposition: HOME/ ROUTINE


Disposition Time: 19:05


Patient Plan: Discharge


Patient Problems: 


 Current Active Problems











Problem Status Onset


 


Allergic reaction Acute  











Condition: FAIR


Discharge Instructions (ExitCare):  Angioedema


Print Language: Kazakh


Prescriptions: 


DiphenhydrAMINE [Benadryl] 50 mg PO Q6 PRN #24 cap


 PRN Reason: Rash


Famotidine [Pepcid] 20 mg PO BID #10 tab


predniSONE [Prednisone] 3 tab PO DAILY #12 tab


Forms:  [employer] (Greek)

## 2018-03-18 NOTE — CP.PCM.DIS
Provider





- Provider


Date of Admission: 


03/17/18 10:48





Attending physician: 


Mamta Perry DO





Primary care physician: 


Luther Ge MD





Time Spent in preparation of Discharge (in minutes): 30





Diagnosis





- Discharge Diagnosis


(1) CAD (coronary artery disease)


Status: Acute   





(2) Chest pain


Status: Acute   





Hospital Course





- Lab Results


Lab Results: 


 Most Recent Lab Values











WBC  5.5 K/uL (4.8-10.8)   03/18/18  05:54    


 


RBC  4.75 Mil/uL (3.80-5.20)   03/18/18  05:54    


 


Hgb  12.6 g/dL (12.0-16.0)   03/18/18  05:54    


 


Hct  39.6 % (34.0-47.0)   03/18/18  05:54    


 


MCV  83.3 fl (81.0-99.0)   03/18/18  05:54    


 


MCH  26.6 pg (27.0-31.0)  L  03/18/18  05:54    


 


MCHC  31.9 g/dL (33.0-37.0)  L  03/18/18  05:54    


 


RDW  16.5 % (11.5-14.5)  H  03/18/18  05:54    


 


Plt Count  220 K/uL (130-400)   03/18/18  05:54    


 


MPV  9.1 fl (7.2-11.7)   03/17/18  09:40    


 


Neut % (Auto)  60.7 % (50.0-75.0)   03/17/18  09:40    


 


Lymph % (Auto)  29.3 % (20.0-40.0)   03/17/18  09:40    


 


Mono % (Auto)  8.0 % (0.0-10.0)   03/17/18  09:40    


 


Eos % (Auto)  1.5 % (0.0-4.0)   03/17/18  09:40    


 


Baso % (Auto)  0.5 % (0.0-2.0)   03/17/18  09:40    


 


Neut # (Auto)  4.1 K/uL (1.8-7.0)   03/17/18  09:40    


 


Lymph # (Auto)  2.0 K/uL (1.0-4.3)   03/17/18  09:40    


 


Mono # (Auto)  0.5 K/uL (0.0-0.8)   03/17/18  09:40    


 


Eos # (Auto)  0.1 K/uL (0.0-0.7)   03/17/18  09:40    


 


Baso # (Auto)  0.0 K/uL (0.0-0.2)   03/17/18  09:40    


 


PT  10.9 Seconds (9.8-13.1)   03/17/18  09:40    


 


INR  1.0  (0.9-1.2)   03/17/18  09:40    


 


APTT  28.8 Seconds (25.6-37.1)   03/17/18  09:40    


 


Sodium  142 mmol/l (132-148)   03/18/18  00:05    


 


Potassium  3.8 MMOL/L (3.6-5.0)   03/18/18  00:05    


 


Chloride  102 mmol/L ()   03/18/18  00:05    


 


Carbon Dioxide  28 mmol/L (22-30)   03/18/18  00:05    


 


Anion Gap  16  (10-20)   03/18/18  00:05    


 


BUN  25 mg/dl (7-17)  H  03/18/18  00:05    


 


Creatinine  0.9 mg/dl (0.7-1.2)   03/18/18  00:05    


 


Est GFR ( Amer)  > 60   03/18/18  00:05    


 


Est GFR (Non-Af Amer)  > 60   03/18/18  00:05    


 


Random Glucose  127 mg/dL ()  H  03/18/18  00:05    


 


Calcium  9.1 mg/dL (8.4-10.2)   03/18/18  00:05    


 


Total Bilirubin  0.9 mg/dl (0.2-1.3)   03/17/18  09:40    


 


AST  34 U/L (14-36)   03/17/18  09:40    


 


ALT  22 U/L (9-52)   03/17/18  09:40    


 


Alkaline Phosphatase  78 U/L ()   03/17/18  09:40    


 


Troponin I  < 0.0120 ng/mL (0.00-0.120)   03/17/18  20:07    


 


Total Protein  7.8 G/DL (6.3-8.2)   03/17/18  09:40    


 


Albumin  3.9 g/dL (3.5-5.0)   03/17/18  09:40    


 


Globulin  3.9 gm/dL (2.2-3.9)   03/17/18  09:40    


 


Albumin/Globulin Ratio  1.0  (1.0-2.1)   03/17/18  09:40    


 


Triglycerides  144 mg/DL (0-149)   03/18/18  00:05    


 


Cholesterol  182 mg/dL (0-199)   03/18/18  00:05    


 


LDL Cholesterol Direct  113 mg/dL (0-129)   03/18/18  00:05    


 


HDL Cholesterol  33 MG/DL (30-70)   03/18/18  00:05    


 


TSH 3rd Generation  0.89 mIU/ML (0.46-4.68)   03/18/18  00:05    














- Hospital Course


Hospital Course: 





72F PMH HTN, HLD, CAD with stent x3 presents to [facility] ER with a 12 hour history 

of acute, pressure like, severe chest pain that lasted appx one hour which woke 

her from sleep, associated with nausea without emesis, no palpitations, 

diaphoresis, or dyspnea. Patient states she has also had a severe frontal 

headache that has lasted about 12 hours. Currently chest pain free. HD stable 

NAD.    





Troponins neg, EKG changes compared to prior; T wave inversions inferior leads, 

prolonged QTc, will place under OBS for concern for possible ACS. ASA given, 

trending enzymes. which have been negative. Patient was also seen by cardiology

, she is chest pain free, and is stable to be discharged home, with follow-up 

with PCP.








Discharge Exam





- Head Exam


Additional comments: 








Physical exam:





Constitutional- cooperative, awake, alert


Head- NCAT, PERRL


Eye- PERRL, EOMI


ENT- normal exam, MMM.


Neck- normal inspection, supple, no JVD


Respiratory- CTAB, no wheezes rales rhonchi


Cardiovascular- RRR, +S1, +S2 no MRG


GI/Abdominal- normal bowel sounds, soft, no mass, no hsm


Skin- warm, dry


Extremities Exam- normal capillary refill, normal inspection


Neurological Exam- alert, awake, oriented


Psych- normal mood, normal affect





Discharge Plan





- Discharge Medications


Prescriptions: 


amLODIPine [Norvasc] 5 mg PO DAILY #30 tab


Labetalol Hydrochloride [Normodyne] 300 mg PO Q12H #60 tab


Valsartan/Hydrochlorothiazide [Diovan Hct 320-25 mg Tablet] 1 tab PO DAILY #30 

tablet





- Follow Up Plan


Condition: SERIOUS


Disposition: HOME/ ROUTINE


Instructions:  Chest Pain


Referrals: 


Luther Ge MD [Primary Care Provider] -

## 2018-03-18 NOTE — CP.PCM.PN
Subjective





- Date & Time of Evaluation


Date of Evaluation: 03/18/18


Time of Evaluation: 08:40





- Subjective


Subjective: 





                                                      Has no further chest pain

, or dyspnoea


                                                      Telemetry shows stable 

sinus rhythm at physiologic rates


                                                      /70 mm Hg


                                                      EKG today: sinus rhythm 

with T inversion in V4-V6 (No Q waves)


                                                      Troponin levels are 

normal (No evidence of myocyte injury)


                                                      May be allowed to return 

home (On ASA daily) for out pt management


                                                      Have instructed the pt 

that she needs stress test to asses presence of coronary stenosis


                                                      She promises to see her 

PD promptly upon D/C





Objective





- Vital Signs/Intake and Output


Vital Signs (last 24 hours): 


 











Temp Pulse Resp BP Pulse Ox


 


 98.5 F   66   18   144/75   97 


 


 03/18/18 07:59  03/18/18 08:48  03/18/18 07:59  03/18/18 08:48  03/18/18 07:59











- Medications


Medications: 


 Current Medications





Acetaminophen (Tylenol 325mg Tab)  650 mg PO Q6 PRN


   PRN Reason: Fever >100.4 F


   Last Admin: 03/17/18 20:00 Dose:  650 mg


Acetaminophen/Butalbital/Caffeine (Fioricet)  1 tab PO Q4 PRN


   PRN Reason: Migraine headache


Aspirin (Ecotrin)  81 mg PO DAILY [facility]


   Last Admin: 03/18/18 08:48 Dose:  81 mg


Carvedilol (Coreg)  6.25 mg PO Q12 [facility]


   Last Admin: 03/18/18 08:48 Dose:  6.25 mg


Clopidogrel Bisulfate (Plavix)  75 mg PO DAILY [facility]


   Last Admin: 03/18/18 08:48 Dose:  75 mg


Heparin Sodium (Porcine) (Heparin)  5,000 units SC Q12 [facility]


   PRN Reason: Protocol


   Last Admin: 03/18/18 08:48 Dose:  5,000 units


Metoclopramide HCl (Reglan)  10 mg IVP Q6 PRN


   PRN Reason: Nausea/Vomiting


Pantoprazole Sodium (Protonix Ec Tab)  40 mg PO DAILY [facility]


   Last Admin: 03/18/18 08:48 Dose:  40 mg











- Labs


Labs: 


 





 03/18/18 05:54 





 03/18/18 00:05 





 











PT  10.9 Seconds (9.8-13.1)   03/17/18  09:40    


 


INR  1.0  (0.9-1.2)   03/17/18  09:40    


 


APTT  28.8 Seconds (25.6-37.1)   03/17/18  09:40

## 2018-03-19 NOTE — CARD
--------------- APPROVED REPORT --------------





EKG Measurement

Heart [id number]

PA [id number]

[id number]

[id number]

[id number]



<Conclusion>

Normal sinus rhythm

Possible Left atrial enlargement

ST & T wave abnormality, consider lateral ischemia

Abnormal ECG

## 2018-07-13 ENCOUNTER — HOSPITAL ENCOUNTER (OUTPATIENT)
Dept: HOSPITAL 14 - H.ER | Age: 73
Setting detail: OBSERVATION
LOS: 1 days | Discharge: HOME | End: 2018-07-14
Attending: HOSPITALIST | Admitting: HOSPITALIST
Payer: MEDICARE

## 2018-07-13 VITALS — BODY MASS INDEX: 37.8 KG/M2

## 2018-07-13 DIAGNOSIS — Z87.891: ICD-10-CM

## 2018-07-13 DIAGNOSIS — N18.9: ICD-10-CM
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
## 2018-07-13 DIAGNOSIS — E78.00: ICD-10-CM

## 2018-07-13 DIAGNOSIS — E86.0: Primary | ICD-10-CM

## 2018-07-13 DIAGNOSIS — I25.10: ICD-10-CM

## 2018-07-13 DIAGNOSIS — Z95.5: ICD-10-CM

## 2018-07-13 DIAGNOSIS — Z79.899: ICD-10-CM

## 2018-07-13 DIAGNOSIS — G43.909: ICD-10-CM

## 2018-07-13 DIAGNOSIS — E78.5: ICD-10-CM

## 2018-07-13 DIAGNOSIS — M79.632: ICD-10-CM

## 2018-07-13 DIAGNOSIS — S82.492A: ICD-10-CM

## 2018-07-13 DIAGNOSIS — I49.3: ICD-10-CM

## 2018-07-13 DIAGNOSIS — Z79.02: ICD-10-CM

## 2018-07-13 DIAGNOSIS — I12.9: ICD-10-CM

## 2018-07-13 DIAGNOSIS — E11.22: ICD-10-CM

## 2018-07-13 DIAGNOSIS — R55: ICD-10-CM

## 2018-07-13 DIAGNOSIS — Z79.82: ICD-10-CM

## 2018-07-13 LAB
ALBUMIN SERPL-MCNC: 4.3 G/DL (ref 3.5–5)
ALBUMIN/GLOB SERPL: 1.1 {RATIO} (ref 1–2.1)
ALT SERPL-CCNC: 23 U/L (ref 9–52)
APTT BLD: 24.9 SECONDS (ref 25.6–37.1)
AST SERPL-CCNC: 23 U/L (ref 14–36)
BASOPHILS # BLD AUTO: 0.1 K/UL (ref 0–0.2)
BASOPHILS NFR BLD: 0.6 % (ref 0–2)
BUN SERPL-MCNC: 26 MG/DL (ref 7–17)
CALCIUM SERPL-MCNC: 9.3 MG/DL (ref 8.4–10.2)
EOSINOPHIL # BLD AUTO: 0 K/UL (ref 0–0.7)
EOSINOPHIL NFR BLD: 0.5 % (ref 0–4)
ERYTHROCYTE [DISTWIDTH] IN BLOOD BY AUTOMATED COUNT: 14.9 % (ref 11.5–14.5)
GFR NON-AFRICAN AMERICAN: 49
HGB BLD-MCNC: 13.1 G/DL (ref 12–16)
INR PPP: 1 (ref 0.9–1.2)
LYMPHOCYTES # BLD AUTO: 1.5 K/UL (ref 1–4.3)
LYMPHOCYTES NFR BLD AUTO: 17.8 % (ref 20–40)
MCH RBC QN AUTO: 27 PG (ref 27–31)
MCHC RBC AUTO-ENTMCNC: 32.8 G/DL (ref 33–37)
MCV RBC AUTO: 82.5 FL (ref 81–99)
MONOCYTES # BLD: 0.5 K/UL (ref 0–0.8)
MONOCYTES NFR BLD: 6.4 % (ref 0–10)
NEUTROPHILS # BLD: 6.3 K/UL (ref 1.8–7)
NEUTROPHILS NFR BLD AUTO: 74.7 % (ref 50–75)
NRBC BLD AUTO-RTO: 0.1 % (ref 0–0)
PLATELET # BLD: 235 K/UL (ref 130–400)
PMV BLD AUTO: 9 FL (ref 7.2–11.7)
PROTHROMBIN TIME: 11.3 SECONDS (ref 9.8–13.1)
RBC # BLD AUTO: 4.83 MIL/UL (ref 3.8–5.2)
WBC # BLD AUTO: 8.4 K/UL (ref 4.8–10.8)

## 2018-07-13 PROCEDURE — 73590 X-RAY EXAM OF LOWER LEG: CPT

## 2018-07-13 PROCEDURE — 73630 X-RAY EXAM OF FOOT: CPT

## 2018-07-13 PROCEDURE — 80053 COMPREHEN METABOLIC PANEL: CPT

## 2018-07-13 PROCEDURE — 85730 THROMBOPLASTIN TIME PARTIAL: CPT

## 2018-07-13 PROCEDURE — 36415 COLL VENOUS BLD VENIPUNCTURE: CPT

## 2018-07-13 PROCEDURE — 85610 PROTHROMBIN TIME: CPT

## 2018-07-13 PROCEDURE — 73090 X-RAY EXAM OF FOREARM: CPT

## 2018-07-13 PROCEDURE — 71045 X-RAY EXAM CHEST 1 VIEW: CPT

## 2018-07-13 PROCEDURE — 73080 X-RAY EXAM OF ELBOW: CPT

## 2018-07-13 PROCEDURE — 85025 COMPLETE CBC W/AUTO DIFF WBC: CPT

## 2018-07-13 PROCEDURE — 85027 COMPLETE CBC AUTOMATED: CPT

## 2018-07-13 PROCEDURE — 99285 EMERGENCY DEPT VISIT HI MDM: CPT

## 2018-07-13 PROCEDURE — 80048 BASIC METABOLIC PNL TOTAL CA: CPT

## 2018-07-13 PROCEDURE — 73610 X-RAY EXAM OF ANKLE: CPT

## 2018-07-13 PROCEDURE — 84484 ASSAY OF TROPONIN QUANT: CPT

## 2018-07-13 PROCEDURE — 70450 CT HEAD/BRAIN W/O DYE: CPT

## 2018-07-13 PROCEDURE — 82948 REAGENT STRIP/BLOOD GLUCOSE: CPT

## 2018-07-13 PROCEDURE — 97162 PT EVAL MOD COMPLEX 30 MIN: CPT

## 2018-07-13 RX ADMIN — OXYCODONE HYDROCHLORIDE AND ACETAMINOPHEN PRN TAB: 5; 325 TABLET ORAL at 21:50

## 2018-07-13 NOTE — RAD
Date of service: 



07/13/2018



PROCEDURE:  Radiographs of the left tibia and fibula. 



HISTORY:

fall



COMPARISON:

None available.



TECHNIQUE:

Frontal and lateral views obtained. 



FINDINGS:



BONES:

Minimally displaced fracture of the distal fibula.



JOINT SPACES:

Unremarkable.



OTHER FINDINGS:

Achilles enthesophyte.  Inferior plantar calcaneal spur.



IMPRESSION:

Minimally displaced fracture of the distal fibula.
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
## 2018-07-13 NOTE — RAD
Date of service: 



07/13/2018



PROCEDURE:  Radiographs of the Left Forearm 



HISTORY:

pain







COMPARISON:

None available.



TECHNIQUE:

Frontal and lateral views obtained. 



FINDINGS:



BONES:

No fracture or destructive lesion.



JOINT SPACES:

Unremarkable.



OTHER FINDINGS:

None.



IMPRESSION:

Unremarkable radiographs of the left forearm.

## 2018-07-13 NOTE — RAD
Date of service: 



07/13/2018



HISTORY:

dizziness  



COMPARISON:

Chest radiograph dated 03/17/2018. 



FINDINGS:



LUNGS:

No active pulmonary disease.



PLEURA:

No significant pleural effusion identified, no pneumothorax apparent.



CARDIOVASCULAR:

Normal.



OSSEOUS STRUCTURES:

Unchanged.



VISUALIZED UPPER ABDOMEN:

Normal.



OTHER FINDINGS:

None.



IMPRESSION:

No active disease.

## 2018-07-13 NOTE — CP.PCM.CON
History of Present Illness





- History of Present Illness


History of Present Illness: 


Podiatry consult note for attending Dr. Gardner





74 y/o female with PMHx of HLD, HTN, DMII, CAD presented to the ED after a fall 

in the parking lot of her physician's office at around 12:30 PM. Patient 

reports she felt weak, and "passed out". Patient states she twisted her ankle 

and is complaining of pain to her left ankle. Patient states her pain is 

worsened with range of motion and when ambulating. Patient denies F/V/N/SOB.





PMHx: HLD, HTN, CAD, DMII


PSHx: 3 stents placed due to CAD


ALL: Enoxaparin Sodium


SHx: previous smoker








Review of Systems





- Review of Systems


All systems: reviewed and no additional remarkable complaints except


Review of Systems: 


As per HPI





Past Patient History





- Infectious Disease


Hx of Infectious Diseases: None





- Past Medical History & Family History


Past Medical History?: Yes





- Past Social History


Alcohol: None


Drugs: Denies





- CARDIAC


Hx Hypercholesterolemia: Yes


Hx Hypertension: Yes





- PULMONARY


Hx Respiratory Disorders: No





- NEUROLOGICAL


Hx Migraine: Yes





- HEENT


Hx HEENT Problems: No





- RENAL


Hx Chronic Kidney Disease: Yes





- ENDOCRINE/METABOLIC


Hx Endocrine Disorders: No





- HEMATOLOGICAL/ONCOLOGICAL


Hx Human Immunodeficiency Virus (HIV): No





- INTEGUMENTARY


Hx Dermatological Problems: No





- MUSCULOSKELETAL/RHEUMATOLOGICAL


Hx Musculoskeletal Disorders: No


Hx Falls: No





- GASTROINTESTINAL


Hx Gastrointestinal Disorders: No





- GENITOURINARY/GYNECOLOGICAL


Hx Genitourinary Disorders: No





- PSYCHIATRIC


Hx Psychophysiologic Disorder: No


Hx Substance Use: No





- SURGICAL HISTORY


Hx Coronary Stent: Yes





- ANESTHESIA


Hx Anesthesia: Yes


Hx Anesthesia Reactions: No


Hx Malignant Hyperthermia: No





Meds


Allergies/Adverse Reactions: 


 Allergies











Allergy/AdvReac Type Severity Reaction Status Date / Time


 


enoxaparin sodium Allergy  REDNESS Verified 07/13/18 13:55





[From Lovenox]     














Physical Exam





- Constitutional


Appears: Well, Non-toxic, No Acute Distress





- Head Exam


Head Exam: ATRAUMATIC, NORMOCEPHALIC





- Extremities Exam


Additional comments: 


Bilateral Lower Extremity Exam:





VASC: DP and PT 2/4 bilaterally, CFT less than 3 seconds X 10, TG within limits 

proximal to distal, non-pitting edema noted to the left ankle, laterally > 

medially, positive varicosities bilaterally





NEURO: Protective sensation intact





DERM: no open wounds, no clinical signs of infection, no erythema noted, 

minimal ecchymosis to the left lateral ankle





ORTHO: pain on palpation at the left lateral malleolus and proximal to the 

malleolus, limited ankle joint range of motion and pain, no pain on palpation 

at the lateral aspect of the foot, pain with STJ inversion and eversion








- Neurological Exam


Neurological exam: Alert, Oriented x3





- Psychiatric Exam


Psychiatric exam: Normal Affect, Normal Mood





Results





- Vital Signs


Recent Vital Signs: 


 Last Vital Signs











Temp  98.2 F   07/13/18 13:55


 


Pulse  80   07/13/18 13:55


 


Resp  18   07/13/18 13:55


 


BP  96/61 L  07/13/18 13:55


 


Pulse Ox  96   07/13/18 16:10














- Labs


Result Diagrams: 


 07/13/18 14:53





 07/13/18 14:53


Labs: 


 Laboratory Results - last 24 hr











  07/13/18 07/13/18 07/13/18





  14:42 14:53 14:53


 


WBC   8.4  D 


 


RBC   4.83 


 


Hgb   13.1 


 


Hct   39.8 


 


MCV   82.5 


 


MCH   27.0 


 


MCHC   32.8 L 


 


RDW   14.9 H 


 


Plt Count   235 


 


MPV   9.0 


 


Neut % (Auto)   74.7 


 


Lymph % (Auto)   17.8 L 


 


Mono % (Auto)   6.4 


 


Eos % (Auto)   0.5 


 


Baso % (Auto)   0.6 


 


Neut # (Auto)   6.3 


 


Lymph # (Auto)   1.5 


 


Mono # (Auto)   0.5 


 


Eos # (Auto)   0.0 


 


Baso # (Auto)   0.1 


 


PT   


 


INR   


 


APTT   


 


Sodium    141


 


Potassium    4.2


 


Chloride    102


 


Carbon Dioxide    25


 


Anion Gap    18


 


BUN    26 H


 


Creatinine    1.1


 


Est GFR ( Amer)    59


 


Est GFR (Non-Af Amer)    49


 


POC Glucose (mg/dL)  148 H  


 


Random Glucose    146 H


 


Calcium    9.3


 


Total Bilirubin    0.7


 


AST    23


 


ALT    23


 


Alkaline Phosphatase    79


 


Troponin I    < 0.0120


 


Total Protein    8.4 H


 


Albumin    4.3


 


Globulin    4.1 H


 


Albumin/Globulin Ratio    1.1














  07/13/18





  14:53


 


WBC 


 


RBC 


 


Hgb 


 


Hct 


 


MCV 


 


MCH 


 


MCHC 


 


RDW 


 


Plt Count 


 


MPV 


 


Neut % (Auto) 


 


Lymph % (Auto) 


 


Mono % (Auto) 


 


Eos % (Auto) 


 


Baso % (Auto) 


 


Neut # (Auto) 


 


Lymph # (Auto) 


 


Mono # (Auto) 


 


Eos # (Auto) 


 


Baso # (Auto) 


 


PT  11.3


 


INR  1.0


 


APTT  24.9 L


 


Sodium 


 


Potassium 


 


Chloride 


 


Carbon Dioxide 


 


Anion Gap 


 


BUN 


 


Creatinine 


 


Est GFR ( Amer) 


 


Est GFR (Non-Af Amer) 


 


POC Glucose (mg/dL) 


 


Random Glucose 


 


Calcium 


 


Total Bilirubin 


 


AST 


 


ALT 


 


Alkaline Phosphatase 


 


Troponin I 


 


Total Protein 


 


Albumin 


 


Globulin 


 


Albumin/Globulin Ratio 














Assessment & Plan





- Assessment and Plan (Free Text)


Assessment: 


74 y/o female seen and evaluated for left ankle fracture in the ED





Plan: 


Patient seen and evaluated in the ED


Chart and Imaging reviewed


Plan discussed with attending Dr. Gardner


Ankle X-ray: minimally displaced fracture of the distal fibula


Foot X-ray: fracture of the distal fibula, joint space narrowing


Tibial-Fibular X-ray: no high Fibular fracture


Patient imaging reviewed with the patient, and conservative vs. surgical 

treatment discussed


Patient to be placed in a posterior splint, and provided with crutches


Patient will follow-up in Dr. Gardner's Monday clinic


Patient strongly advised to stay Non weight-bearing to the left foot


Patient demonstrated verbal understanding and all patient questions answered


Thank you for the podiatry consult











- Date & Time


Date: 07/13/18


Time: 17:05

## 2018-07-13 NOTE — RAD
Date of service: 



07/13/2018



PROCEDURE:  Left Ankle Radiographs.



HISTORY:

pain  



COMPARISON:

None



FINDINGS:



BONES:

Minimally displaced fracture of the distal fibula. 



JOINTS:

Joint space narrowing. Ankle mortise maintained. Talar dome intact



SOFT TISSUES:

Bimalleolar soft tissue swelling. Ankle joint effusion. 



OTHER FINDINGS:

Achilles enthesophyte.  Inferior plantar calcaneal spur.



IMPRESSION:

Minimally displaced fracture of the distal fibula.

## 2018-07-13 NOTE — RAD
Date of service: 



07/13/2018



PROCEDURE:  Left Foot Radiographs.



HISTORY:

pain  



COMPARISON:

None.



FINDINGS:



BONES:

Minimally displaced of the distal fibula. 



JOINTS:

Joint space narrowing. 



SOFT TISSUES:

Ankle joint effusion. 



OTHER FINDINGS:

Achilles enthesophyte.  Inferior plantar calcaneal spur.



IMPRESSION:

Minimally displaced fracture of the distal fibula.

## 2018-07-13 NOTE — RAD
Date of service: 



07/13/2018



PROCEDURE:  Radiographs of the left elbow.



HISTORY:

pain  



COMPARISON:

No prior.



FINDINGS:



BONES:

No acute fracture.



JOINTS:

Joint space narrowing.



SOFT TISSUES:

Normal.



JOINT EFFUSION:

None.



OTHER FINDINGS:

None



IMPRESSION:

No demonstrated fracture or dislocation. Mild degenerative changes

## 2018-07-13 NOTE — CT
Date of service: 



07/13/2018



PROCEDURE:  CT HEAD WITHOUT CONTRAST.



HISTORY:

headache



COMPARISON:

CT head dated 03/17/2018 



TECHNIQUE:

Axial computed tomography images were obtained through the head/brain 

without intravenous contrast.  



Radiation dose:



Total exam DLP = 859.8 mGy-cm.



This CT exam was performed using one or more of the following dose 

reduction techniques: Automated exposure control, adjustment of the 

mA and/or kV according to patient size, and/or use of iterative 

reconstruction technique.



FINDINGS:



HEMORRHAGE:

No intracranial hemorrhage. 



BRAIN:

No mass effect or edema. Mild atrophy.  Mild chronic microvascular 

ischemic changes. Left thalamic lacunar infarction. 



VENTRICLES:

Unremarkable. No hydrocephalus. 



CALVARIUM:

Unremarkable.



PARANASAL SINUSES:

Unremarkable as visualized. Right middle turbinate kolby bullosa. No 

significant inflammatory changes.  



MASTOID AIR CELLS:

Unremarkable as visualized. No inflammatory changes.



OTHER FINDINGS:

None.



IMPRESSION:

No acute intracranial pathology.  Age-related changes.

## 2018-07-13 NOTE — CP.PCM.HP
History of Present Illness





- History of Present Illness


History of Present Illness: 





This is a 73 year old female with a pmh of HLD, HTN, DMII, CAD with stents x 3 

presented to the ED after a fall in the parking lot of her physician's office. 

The patient states that she was stepping out of her PMD's office and walking to 

her car when she began feeling weak, lightheaded, and then had a syncopal 

episode. The patient states that she twisted her left ankle when she passed out 

and arrived to the ED c/o pain to the left ankle. In the ED, X rays were done 

and Left minimally displaced tib fx was found. CT was negative for CVA or 

hemorrhage. She is being admitted for syncope workup, felt to be likely 

vasovagal. EKG shows no acute changes. Chest pain free, HD stable, NAD





Present on Admission





- Present on Admission


Any Indicators Present on Admission: No


History of DVT/PE: No


History of Uncontrolled Diabetes: No





Review of Systems





- Review of Systems


Review of Systems: 





A 12 point review of systems was conducted and found to be negative other than 

what was mentioned in the HPI. 





Past Patient History





- Infectious Disease


Hx of Infectious Diseases: None





- Past Medical History & Family History


Past Medical History?: Yes





- Past Social History


Alcohol: None


Drugs: Denies





- CARDIAC


Hx Hypercholesterolemia: Yes


Hx Hypertension: Yes





- PULMONARY


Hx Respiratory Disorders: No





- NEUROLOGICAL


Hx Migraine: Yes





- HEENT


Hx HEENT Problems: No





- RENAL


Hx Chronic Kidney Disease: Yes





- ENDOCRINE/METABOLIC


Hx Endocrine Disorders: No





- HEMATOLOGICAL/ONCOLOGICAL


Hx Human Immunodeficiency Virus (HIV): No





- INTEGUMENTARY


Hx Dermatological Problems: No





- MUSCULOSKELETAL/RHEUMATOLOGICAL


Hx Musculoskeletal Disorders: No


Hx Falls: No





- GASTROINTESTINAL


Hx Gastrointestinal Disorders: No





- GENITOURINARY/GYNECOLOGICAL


Hx Genitourinary Disorders: No





- PSYCHIATRIC


Hx Psychophysiologic Disorder: No


Hx Substance Use: No





- SURGICAL HISTORY


Hx Coronary Stent: Yes





- ANESTHESIA


Hx Anesthesia: Yes


Hx Anesthesia Reactions: No


Hx Malignant Hyperthermia: No





Meds


Allergies/Adverse Reactions: 


 Allergies











Allergy/AdvReac Type Severity Reaction Status Date / Time


 


enoxaparin sodium Allergy  REDNESS Verified 07/13/18 13:55





[From Lovenox]     














Physical Exam





- Additional Findings


Additional findings: 





Vitals reviewed and stable


Constitutional- cooperative, awake, alert.


Head- NCAT, PERRL


Eye- PERRL, normal accommodation


ENT- normal exam, MMM.


Neck- normal inspection, supple, no JVD


Respiratory- CTAB, no wheezes rales rhonchi


Cardiovascular- RRR, +S1, +S2 no MRG


GI/Abdominal- normal bowel sounds, soft


Skin- warm, dry


Extremities Exam- normal capillary refill. Left arm with minimal abrasion, no 

bleeding or open wound. Left foot splinted by podiatry


Neurological Exam- alert, oriented


Psych- normal mood, normal affect





Results





- Vital Signs


Recent Vital Signs: 





 Last Vital Signs











Temp  98.7 F   07/13/18 17:07


 


Pulse  72   07/13/18 17:49


 


Resp  19   07/13/18 17:49


 


BP  120/80   07/13/18 17:49


 


Pulse Ox  98   07/13/18 17:49














- Labs


Result Diagrams: 


 07/13/18 14:53





 07/13/18 14:53


Labs: 





 Laboratory Results - last 24 hr











  07/13/18 07/13/18 07/13/18





  14:42 14:53 14:53


 


WBC   8.4  D 


 


RBC   4.83 


 


Hgb   13.1 


 


Hct   39.8 


 


MCV   82.5 


 


MCH   27.0 


 


MCHC   32.8 L 


 


RDW   14.9 H 


 


Plt Count   235 


 


MPV   9.0 


 


Neut % (Auto)   74.7 


 


Lymph % (Auto)   17.8 L 


 


Mono % (Auto)   6.4 


 


Eos % (Auto)   0.5 


 


Baso % (Auto)   0.6 


 


Neut # (Auto)   6.3 


 


Lymph # (Auto)   1.5 


 


Mono # (Auto)   0.5 


 


Eos # (Auto)   0.0 


 


Baso # (Auto)   0.1 


 


PT   


 


INR   


 


APTT   


 


Sodium    141


 


Potassium    4.2


 


Chloride    102


 


Carbon Dioxide    25


 


Anion Gap    18


 


BUN    26 H


 


Creatinine    1.1


 


Est GFR ( Amer)    59


 


Est GFR (Non-Af Amer)    49


 


POC Glucose (mg/dL)  148 H  


 


Random Glucose    146 H


 


Calcium    9.3


 


Total Bilirubin    0.7


 


AST    23


 


ALT    23


 


Alkaline Phosphatase    79


 


Troponin I    < 0.0120


 


Total Protein    8.4 H


 


Albumin    4.3


 


Globulin    4.1 H


 


Albumin/Globulin Ratio    1.1














  07/13/18





  14:53


 


WBC 


 


RBC 


 


Hgb 


 


Hct 


 


MCV 


 


MCH 


 


MCHC 


 


RDW 


 


Plt Count 


 


MPV 


 


Neut % (Auto) 


 


Lymph % (Auto) 


 


Mono % (Auto) 


 


Eos % (Auto) 


 


Baso % (Auto) 


 


Neut # (Auto) 


 


Lymph # (Auto) 


 


Mono # (Auto) 


 


Eos # (Auto) 


 


Baso # (Auto) 


 


PT  11.3


 


INR  1.0


 


APTT  24.9 L


 


Sodium 


 


Potassium 


 


Chloride 


 


Carbon Dioxide 


 


Anion Gap 


 


BUN 


 


Creatinine 


 


Est GFR ( Amer) 


 


Est GFR (Non-Af Amer) 


 


POC Glucose (mg/dL) 


 


Random Glucose 


 


Calcium 


 


Total Bilirubin 


 


AST 


 


ALT 


 


Alkaline Phosphatase 


 


Troponin I 


 


Total Protein 


 


Albumin 


 


Globulin 


 


Albumin/Globulin Ratio 














Assessment & Plan





- Assessment and Plan (Free Text)


Plan: 





This is a 73 year old female with a pmh of HLD, HTN, DMII, CAD with stents x 3 

presented to the ED after a fall in the parking lot of her physician's office. 

The patient states that she was stepping out of her PMD's office and walking to 

her car when she began feeling weak, lightheaded, and then had a syncopal 

episode. The patient states that she twisted her left ankle when she passed out 

and arrived to the ED c/o pain to the left ankle. In the ED, X rays were done 

and Left minimally displaced tib fx was found. CT was negative for CVA or 

hemorrhage. She is being admitted for syncope workup, felt to be likely 

vasovagal. EKG shows no acute changes. Chest pain free, HD stable, NAD





1) Syncopal episode, likely vasovagal, possibly due to dehydration and/or 

medication effect


- Place on tele/obs


- Consult Dr. JOSE France


- Cycle troponins


- Repeat EKG


- Last echo in May essentially normal, normal EF and no significant impairment 

of left or right systolic function


- Carotid doppler





2) Left minimally displaced tibula fracture


- Tylenol #3 for pain


- Dr. Gardner, podiatry, on consult





3) CAD with stents x 3


- ASA


- Plavix 





3) HTN hx


- Hold antihypertensives due to vasovagal event and low BP





4) HLD


- Statin





5) DVT prophylaxis


- SCDs (pt allergic to Lovenox)

## 2018-07-14 VITALS — SYSTOLIC BLOOD PRESSURE: 138 MMHG | HEART RATE: 78 BPM | DIASTOLIC BLOOD PRESSURE: 79 MMHG

## 2018-07-14 VITALS — OXYGEN SATURATION: 99 % | RESPIRATION RATE: 18 BRPM | TEMPERATURE: 98.3 F

## 2018-07-14 LAB
BUN SERPL-MCNC: 26 MG/DL (ref 7–17)
CALCIUM SERPL-MCNC: 8.7 MG/DL (ref 8.4–10.2)
ERYTHROCYTE [DISTWIDTH] IN BLOOD BY AUTOMATED COUNT: 14.8 % (ref 11.5–14.5)
GFR NON-AFRICAN AMERICAN: 49
HGB BLD-MCNC: 11.7 G/DL (ref 12–16)
MCH RBC QN AUTO: 27 PG (ref 27–31)
MCHC RBC AUTO-ENTMCNC: 32.7 G/DL (ref 33–37)
MCV RBC AUTO: 82.5 FL (ref 81–99)
PLATELET # BLD: 202 K/UL (ref 130–400)
RBC # BLD AUTO: 4.34 MIL/UL (ref 3.8–5.2)
WBC # BLD AUTO: 7.8 K/UL (ref 4.8–10.8)

## 2018-07-14 RX ADMIN — OXYCODONE HYDROCHLORIDE AND ACETAMINOPHEN PRN TAB: 5; 325 TABLET ORAL at 10:20

## 2018-07-14 NOTE — CP.PCM.CON
History of Present Illness





- History of Present Illness


History of Present Illness: 





                              This 73-year-old female arrived in the emergency 

room after experiencing a syncopal episode in the parking lot off her physician'

s office. Leaving his office as she walked to the car she felt faint had 

graying of her vision and an actor sense of weakness followed by abrupt loss of 

consciousness and woke up on the ground having severely sprained her left 

ankle. She denies lateralizing neurological deficit in the period immediately 

upon regaining consciousness such as abnormalities of speech or vision or any 

motor deficit. She is a hypertensive and diabetic was hospitalized in March of 

this year for chest pain and an acute coronary syndrome was ruled out. Patient 

at that time was instructed to get ischemic workup because there was a prior 

history of having required coronary stents more than 8 years back. She does not 

report any episode of chest pain preceding this event or any palpitations. 

There have not been any other syncopal episodes in the past. She denies a 

history suggestive of congestive cardiac failure and an echocardiogram in March 

had demonstrated a preserved left ventricular systolic function with no wall 

motion abnormality.


Physical examination shows an elderly pleasant lady with a bandaged left ankle. 

Her telemetry shows steady sinus rhythm with rare isolated premature 

ventricular beats with a physiological rates. Her blood pressure was 122/70 

mmHg with no orthostatic change. Her jugular venous pressure was not elevated 

and there was no pitting edema over her right lower extremity. Her right pedal 

pulses were well felt. The left pedal pulses could not be assessed because of a 

bandaged left ankle. There were no carotid bruits. The apex was not palpable. 

The first and second heart sounds were normal. There was no murmur or gallop. 

There were no rales. Her electrocardiogram showed sinus rhythm with T-wave 

inversions in 1, aVL and V5 and V6, a pattern seen on her earlier electro-

cardial grams as well. There were no fresh EKG changes and no Q waves were seen 

on the electric cardiac gram. Her lab data was reviewed. Her troponin levels 

were normal 2 indicating no evidence of myocyte injury.





Impression: Syncopal episode most likely vasovagal in nature. A history of 

hypertension and diabetes and stable coronary artery disease. History of 

multiple coronary stents more than 8 years back.


The patient may be allowed to return home and see her cardiologist whom she 

sees on a regular basis.





Past Patient History





- Infectious Disease


Hx of Infectious Diseases: None





- Past Medical History & Family History


Past Medical History?: Yes





- Past Social History


Smoking Status: Never Smoked





- CARDIAC


Hx Cardiac Disorders: Yes


Hx Hypertension: Yes





- PULMONARY


Hx Respiratory Disorders: No





- NEUROLOGICAL


Hx Neurological Disorder: Yes


Hx Migraine: Yes





- HEENT


Hx HEENT Problems: No





- RENAL


Hx Chronic Kidney Disease: Yes





- ENDOCRINE/METABOLIC


Hx Endocrine Disorders: Yes


Hx Diabetes Mellitus Type 2: Yes





- HEMATOLOGICAL/ONCOLOGICAL


Hx Blood Disorders: No


Hx AIDS: No


Hx Human Immunodeficiency Virus (HIV): No





- INTEGUMENTARY


Hx Dermatological Problems: No





- MUSCULOSKELETAL/RHEUMATOLOGICAL


Hx Musculoskeletal Disorders: No


Hx Falls: Yes





- GASTROINTESTINAL


Hx Gastrointestinal Disorders: No





- GENITOURINARY/GYNECOLOGICAL


Hx Genitourinary Disorders: No





- PSYCHIATRIC


Hx Psychophysiologic Disorder: No


Hx Substance Use: No





- SURGICAL HISTORY


Hx Surgeries: Yes


Hx Coronary Stent: Yes





- ANESTHESIA


Hx Anesthesia: Yes


Hx Anesthesia Reactions: No


Hx Malignant Hyperthermia: No





Meds


Allergies/Adverse Reactions: 


 Allergies











Allergy/AdvReac Type Severity Reaction Status Date / Time


 


enoxaparin sodium Allergy  REDNESS Verified 07/13/18 13:55





[From Lovenox]     














- Medications


Medications: 


 Current Medications





Acetaminophen (Tylenol 325mg Tab)  650 mg PO Q6 PRN


   PRN Reason: Pain, Mild (1-3)


Acetaminophen/Codeine Phosphate (Tylenol/Codeine 300 Mg/30 Mg)  1 tab PO Q4 PRN


   PRN Reason: Pain, moderate (4-7)


Aspirin (Ecotrin)  81 mg PO DAILY [facility]


   Last Admin: 07/14/18 10:14 Dose:  81 mg


Atorvastatin Calcium (Lipitor)  80 mg PO HS [facility]


   Last Admin: 07/13/18 22:00 Dose:  Not Given


Clopidogrel Bisulfate (Plavix)  75 mg PO DAILY [facility]


   Last Admin: 07/14/18 10:15 Dose:  75 mg


Ergocalciferol (Drisdol 50,000 Intl Units Cap)  1 cap PO SAT [facility]


   Last Admin: 07/14/18 10:13 Dose:  1 cap


Hydrochlorothiazide (Hydrodiuril)  25 mg PO DAILY [facility]


   Last Admin: 07/14/18 10:14 Dose:  25 mg


Sodium Chloride (Sodium Chloride 0.9%)  1,000 mls @ 75 mls/hr IV .[id number] [facility]


   Last Admin: 07/14/18 05:33 Dose:  75 mls/hr


Oxycodone/Acetaminophen (Percocet 5/325 Mg Tab)  1 tab PO Q4 PRN


   PRN Reason: Pain, severe (8-10)


   Stop: 07/16/18 17:53


   Last Admin: 07/14/18 10:20 Dose:  1 tab


Pantoprazole Sodium (Protonix Ec Tab)  40 mg PO DAILY [facility]


   Last Admin: 07/14/18 10:15 Dose:  40 mg


Valsartan (Diovan)  320 mg PO DAILY [facility]


   Last Admin: 07/14/18 10:12 Dose:  320 mg











Results





- Vital Signs


Recent Vital Signs: 


 Last Vital Signs











Temp  98.1 F   07/14/18 08:00


 


Pulse  69   07/14/18 08:00


 


Resp  18   07/14/18 08:00


 


BP  144/77   07/14/18 08:00


 


Pulse Ox  95   07/14/18 08:00














- Labs


Result Diagrams: 


 07/14/18 06:30





 07/14/18 06:30


Labs: 


 Laboratory Results - last 24 hr











  07/13/18 07/13/18 07/13/18





  14:42 14:53 14:53


 


WBC   8.4  D 


 


RBC   4.83 


 


Hgb   13.1 


 


Hct   39.8 


 


MCV   82.5 


 


MCH   27.0 


 


MCHC   32.8 L 


 


RDW   14.9 H 


 


Plt Count   235 


 


MPV   9.0 


 


Neut % (Auto)   74.7 


 


Lymph % (Auto)   17.8 L 


 


Mono % (Auto)   6.4 


 


Eos % (Auto)   0.5 


 


Baso % (Auto)   0.6 


 


Neut # (Auto)   6.3 


 


Lymph # (Auto)   1.5 


 


Mono # (Auto)   0.5 


 


Eos # (Auto)   0.0 


 


Baso # (Auto)   0.1 


 


PT   


 


INR   


 


APTT   


 


Sodium    141


 


Potassium    4.2


 


Chloride    102


 


Carbon Dioxide    25


 


Anion Gap    18


 


BUN    26 H


 


Creatinine    1.1


 


Est GFR ( Amer)    59


 


Est GFR (Non-Af Amer)    49


 


POC Glucose (mg/dL)  148 H  


 


Random Glucose    146 H


 


Calcium    9.3


 


Total Bilirubin    0.7


 


AST    23


 


ALT    23


 


Alkaline Phosphatase    79


 


Troponin I    < 0.0120


 


Total Protein    8.4 H


 


Albumin    4.3


 


Globulin    4.1 H


 


Albumin/Globulin Ratio    1.1














  07/13/18 07/13/18 07/14/18





  14:53 19:37 03:29


 


WBC   


 


RBC   


 


Hgb   


 


Hct   


 


MCV   


 


MCH   


 


MCHC   


 


RDW   


 


Plt Count   


 


MPV   


 


Neut % (Auto)   


 


Lymph % (Auto)   


 


Mono % (Auto)   


 


Eos % (Auto)   


 


Baso % (Auto)   


 


Neut # (Auto)   


 


Lymph # (Auto)   


 


Mono # (Auto)   


 


Eos # (Auto)   


 


Baso # (Auto)   


 


PT  11.3  


 


INR  1.0  


 


APTT  24.9 L  


 


Sodium   


 


Potassium   


 


Chloride   


 


Carbon Dioxide   


 


Anion Gap   


 


BUN   


 


Creatinine   


 


Est GFR ( Amer)   


 


Est GFR (Non-Af Amer)   


 


POC Glucose (mg/dL)   


 


Random Glucose   


 


Calcium   


 


Total Bilirubin   


 


AST   


 


ALT   


 


Alkaline Phosphatase   


 


Troponin I   < 0.0120  < 0.0120


 


Total Protein   


 


Albumin   


 


Globulin   


 


Albumin/Globulin Ratio   














  07/14/18 07/14/18 07/14/18





  04:56 06:30 06:30


 


WBC    7.8


 


RBC    4.34


 


Hgb    11.7 L


 


Hct    35.8


 


MCV    82.5


 


MCH    27.0


 


MCHC    32.7 L


 


RDW    14.8 H


 


Plt Count    202


 


MPV   


 


Neut % (Auto)   


 


Lymph % (Auto)   


 


Mono % (Auto)   


 


Eos % (Auto)   


 


Baso % (Auto)   


 


Neut # (Auto)   


 


Lymph # (Auto)   


 


Mono # (Auto)   


 


Eos # (Auto)   


 


Baso # (Auto)   


 


PT   


 


INR   


 


APTT   


 


Sodium   142 


 


Potassium   3.7 


 


Chloride   106 


 


Carbon Dioxide   28 


 


Anion Gap   12 


 


BUN   26 H 


 


Creatinine   1.1 


 


Est GFR ( Amer)   59 


 


Est GFR (Non-Af Amer)   49 


 


POC Glucose (mg/dL)  99  


 


Random Glucose   102 


 


Calcium   8.7 


 


Total Bilirubin   


 


AST   


 


ALT   


 


Alkaline Phosphatase   


 


Troponin I   


 


Total Protein   


 


Albumin   


 


Globulin   


 


Albumin/Globulin Ratio

## 2018-07-14 NOTE — CP.PCM.DIS
Provider





- Provider


Date of Admission: 


07/13/18 17:19





Attending physician: 


Melvin Calderon DO





Time Spent in preparation of Discharge (in minutes): 45





Diagnosis





- Discharge Diagnosis


(1) Vaso vagal episode


Status: Acute   





(2) Dehydration


Status: Acute   





(3) Syncope


Status: Acute   





Hospital Course





- Lab Results


Lab Results: 


 Most Recent Lab Values











WBC  7.8 K/uL (4.8-10.8)   07/14/18  06:30    


 


RBC  4.34 Mil/uL (3.80-5.20)   07/14/18  06:30    


 


Hgb  11.7 g/dL (12.0-16.0)  L  07/14/18  06:30    


 


Hct  35.8 % (34.0-47.0)   07/14/18  06:30    


 


MCV  82.5 fl (81.0-99.0)   07/14/18  06:30    


 


MCH  27.0 pg (27.0-31.0)   07/14/18  06:30    


 


MCHC  32.7 g/dL (33.0-37.0)  L  07/14/18  06:30    


 


RDW  14.8 % (11.5-14.5)  H  07/14/18  06:30    


 


Plt Count  202 K/uL (130-400)   07/14/18  06:30    


 


MPV  9.0 fl (7.2-11.7)   07/13/18  14:53    


 


Neut % (Auto)  74.7 % (50.0-75.0)   07/13/18  14:53    


 


Lymph % (Auto)  17.8 % (20.0-40.0)  L  07/13/18  14:53    


 


Mono % (Auto)  6.4 % (0.0-10.0)   07/13/18  14:53    


 


Eos % (Auto)  0.5 % (0.0-4.0)   07/13/18  14:53    


 


Baso % (Auto)  0.6 % (0.0-2.0)   07/13/18  14:53    


 


Neut # (Auto)  6.3 K/uL (1.8-7.0)   07/13/18  14:53    


 


Lymph # (Auto)  1.5 K/uL (1.0-4.3)   07/13/18  14:53    


 


Mono # (Auto)  0.5 K/uL (0.0-0.8)   07/13/18  14:53    


 


Eos # (Auto)  0.0 K/uL (0.0-0.7)   07/13/18  14:53    


 


Baso # (Auto)  0.1 K/uL (0.0-0.2)   07/13/18  14:53    


 


PT  11.3 Seconds (9.8-13.1)   07/13/18  14:53    


 


INR  1.0  (0.9-1.2)   07/13/18  14:53    


 


APTT  24.9 Seconds (25.6-37.1)  L  07/13/18  14:53    


 


Sodium  142 mmol/l (132-148)   07/14/18  06:30    


 


Potassium  3.7 MMOL/L (3.6-5.0)   07/14/18  06:30    


 


Chloride  106 mmol/L ()   07/14/18  06:30    


 


Carbon Dioxide  28 mmol/L (22-30)   07/14/18  06:30    


 


Anion Gap  12  (10-20)   07/14/18  06:30    


 


BUN  26 mg/dl (7-17)  H  07/14/18  06:30    


 


Creatinine  1.1 mg/dl (0.7-1.2)   07/14/18  06:30    


 


Est GFR ( Amer)  59   07/14/18  06:30    


 


Est GFR (Non-Af Amer)  49   07/14/18  06:30    


 


POC Glucose (mg/dL)  159 mg/dL ()  H  07/14/18  11:37    


 


Random Glucose  102 mg/dL ()   07/14/18  06:30    


 


Calcium  8.7 mg/dL (8.4-10.2)   07/14/18  06:30    


 


Total Bilirubin  0.7 mg/dl (0.2-1.3)   07/13/18  14:53    


 


AST  23 U/L (14-36)   07/13/18  14:53    


 


ALT  23 U/L (9-52)   07/13/18  14:53    


 


Alkaline Phosphatase  79 U/L ()   07/13/18  14:53    


 


Troponin I  < 0.0120 ng/mL (0.00-0.120)   07/14/18  03:29    


 


Total Protein  8.4 G/DL (6.3-8.2)  H  07/13/18  14:53    


 


Albumin  4.3 g/dL (3.5-5.0)   07/13/18  14:53    


 


Globulin  4.1 gm/dL (2.2-3.9)  H  07/13/18  14:53    


 


Albumin/Globulin Ratio  1.1  (1.0-2.1)   07/13/18  14:53    














- Hospital Course


Hospital Course: 





 73 year old female with a pmh of HLD, HTN, DMII, CAD with stents x 3 presented 

to the ED after a fall in the parking lot of her physician's office. The 

patient states that she was stepping out of her PMD's office and walking to her 

car when she began feeling weak, lightheaded, and then had a syncopal episode. 

The patient states that she twisted her left ankle when she passed out and 

arrived to the ED c/o pain to the left ankle. In the ED, X rays were done and 

Left minimally displaced tib fx was found. CT was negative for CVA or 

hemorrhage. She is being admitted for syncope workup, felt to be likely 

vasovagal. EKG shows no acute changes. Chest pain free, HD stable, NAD





Patient was evaluated by Cardiology, discussed. Syncope secondary to vasovagal 

episode. Cardiac enzymes negative. No acute EKG changes. Pt also seen by 

podiatry, and to follow up as an outpatient. Patient to resume all home 

medications and follow up with PCP as an outpatient in one week. Note: carotid 

dopplers canceled as per guidelines do not indicate necessity at this time, 

given vasovagal etiology and low risk, no seizure symptoms. 





Discharge Exam





- Head Exam


Head Exam: ATRAUMATIC, NORMOCEPHALIC





- Eye Exam


Eye Exam: EOMI, Normal appearance, PERRL





- ENT Exam


ENT Exam: Mucous Membranes Moist, Normal Oropharynx





- Respiratory Exam


Respiratory Exam: Clear to PA & Lateral, NORMAL BREATHING PATTERN





- Cardiovascular Exam


Cardiovascular Exam: RRR, +S1, +S2





- GI/Abdominal Exam


GI & Abdominal Exam: Normal Bowel Sounds, Soft.  absent: Mass, Organomegaly, 

Tenderness





- Extremities Exam


Extremities exam: normal capillary refill, pedal pulses present





- Back Exam


Back exam: absent: CVA tenderness (L), CVA tenderness (R)





- Neurological Exam


Neurological exam: Alert, Oriented x3





- Psychiatric Exam


Psychiatric exam: Normal Affect, Normal Mood





- Skin


Skin Exam: Dry, Normal Color, Warm





Discharge Plan





- Follow Up Plan


Condition: FAIR


Disposition: HOME/ ROUTINE


Additional Instructions: 


FOLLOW UP PCP IN ONE WEEK

## 2018-07-16 NOTE — CARD
--------------- APPROVED REPORT --------------





Date of service: 07/13/2018



EKG Measurement

Heart [id number]

MD [id number]

[id number]

[id number]

[id number]



<Conclusion>

Normal sinus rhythm

Possible Left atrial enlargement

Possible Inferior infarct, age undetermined

T wave abnormality, consider lateral ischemia

Abnormal ECG

## 2018-08-08 ENCOUNTER — HOSPITAL ENCOUNTER (OUTPATIENT)
Dept: HOSPITAL 42 - CATH | Age: 73
Discharge: HOME | End: 2018-08-08
Attending: INTERNAL MEDICINE
Payer: MEDICARE

## 2018-08-08 VITALS
SYSTOLIC BLOOD PRESSURE: 154 MMHG | DIASTOLIC BLOOD PRESSURE: 72 MMHG | TEMPERATURE: 98 F | HEART RATE: 66 BPM | RESPIRATION RATE: 18 BRPM

## 2018-08-08 VITALS — OXYGEN SATURATION: 98 %

## 2018-08-08 VITALS — BODY MASS INDEX: 37.8 KG/M2

## 2018-08-08 DIAGNOSIS — I10: ICD-10-CM

## 2018-08-08 DIAGNOSIS — E11.9: ICD-10-CM

## 2018-08-08 DIAGNOSIS — E78.5: ICD-10-CM

## 2018-08-08 DIAGNOSIS — I25.10: Primary | ICD-10-CM

## 2018-08-08 DIAGNOSIS — Z95.5: ICD-10-CM

## 2018-08-08 LAB
APTT BLD: 28.2 SECONDS (ref 25.1–36.5)
BASOPHILS # BLD AUTO: 0.04 K/MM3 (ref 0–2)
BASOPHILS NFR BLD: 0.6 % (ref 0–3)
BUN SERPL-MCNC: 14 MG/DL (ref 7–21)
CALCIUM SERPL-MCNC: 9.5 MG/DL (ref 8.4–10.5)
EOSINOPHIL # BLD: 0.2 10*3/UL (ref 0–0.7)
EOSINOPHIL NFR BLD: 2.4 % (ref 1.5–5)
ERYTHROCYTE [DISTWIDTH] IN BLOOD BY AUTOMATED COUNT: 15.4 % (ref 11.5–14.5)
GFR NON-AFRICAN AMERICAN: > 60
GRANULOCYTES # BLD: 2.74 10*3/UL (ref 1.4–6.5)
GRANULOCYTES NFR BLD: 43.9 % (ref 50–68)
HGB BLD-MCNC: 12.1 G/DL (ref 12–16)
INR PPP: 1
LYMPHOCYTES # BLD: 2.9 10*3/UL (ref 1.2–3.4)
LYMPHOCYTES NFR BLD AUTO: 47 % (ref 22–35)
MCH RBC QN AUTO: 26.2 PG (ref 25–35)
MCHC RBC AUTO-ENTMCNC: 32.2 G/DL (ref 31–37)
MCV RBC AUTO: 81.6 FL (ref 80–105)
MONOCYTES # BLD AUTO: 0.4 10*3/UL (ref 0.1–0.6)
MONOCYTES NFR BLD: 6.1 % (ref 1–6)
PLATELET # BLD: 271 10^3/UL (ref 120–450)
PMV BLD AUTO: 10.3 FL (ref 7–11)
PROTHROMBIN TIME: 11.5 SECONDS (ref 9.4–12.5)
RBC # BLD AUTO: 4.61 10^6/UL (ref 3.5–6.1)
WBC # BLD AUTO: 6.2 10^3/UL (ref 4.5–11)

## 2018-08-08 PROCEDURE — 36415 COLL VENOUS BLD VENIPUNCTURE: CPT

## 2018-08-08 PROCEDURE — 93005 ELECTROCARDIOGRAM TRACING: CPT

## 2018-08-08 PROCEDURE — 93458 L HRT ARTERY/VENTRICLE ANGIO: CPT

## 2018-08-08 PROCEDURE — 99152 MOD SED SAME PHYS/QHP 5/>YRS: CPT

## 2018-08-08 PROCEDURE — 85025 COMPLETE CBC W/AUTO DIFF WBC: CPT

## 2018-08-08 PROCEDURE — 85730 THROMBOPLASTIN TIME PARTIAL: CPT

## 2018-08-08 PROCEDURE — 80048 BASIC METABOLIC PNL TOTAL CA: CPT

## 2018-08-08 PROCEDURE — 99153 MOD SED SAME PHYS/QHP EA: CPT

## 2018-08-08 PROCEDURE — 83036 HEMOGLOBIN GLYCOSYLATED A1C: CPT

## 2018-08-08 PROCEDURE — 85610 PROTHROMBIN TIME: CPT

## 2018-08-08 NOTE — CARDCATH
Copied To:  Dustin Boyer MD

Attending MD:  Dustin Boyer MD



PROCEDURE DATE:  08/08/2018



INDICATIONS:  Bess Smith is a 73-year-old female with past medical history

significant for hypertension, diabetes mellitus, hyperlipidemia, CAD status

post stenting of LAD and obtuse marginal branch who underwent a nuclear

stress test for preoperative cardiovascular risk stratification prior to an

ankle surgery.  She was brought to the cath lab for further evaluation and

treatment.



PROCEDURES PERFORMED:  Left heart catheterization with selective left and

right coronary angiograms, left ventriculogram, 6-Portuguese right femoral

arterial access, and AngioSeal closure device for hemostasis.



TECHNIQUES OF PROCEDURE:  After obtaining informed consent, the patient was

brought to the cardiac catheterization suite in post-absorptive and

non-sedated state.  The patient was prepped and draped in the usual sterile

fashion.  A 2% lidocaine was used for infiltration of anesthesia.  Using

modified Seldinger technique, a 6-Portuguese sheath was introduced into the

right femoral artery.  Subsequently over a J-wire, JL4 and JR4 diagnostic

catheters were used to engage the left and right coronary systems and

angiograms were obtained in different orthogonal views.  Subsequently, an

LV gram was obtained with angled pigtail catheter across the aortic valve. 

Hemodynamics were obtained and pullback gradients were noted.



HEMODYNAMICS:  Left ventricular end-diastolic pressure was 15 mmHg.  There

was no gradient noted upon the aortic valve pullback.  There was no AI, no

MR.  Left ventricular ejection fraction estimated to be 60% to 65%.



CORONARY ANATOMY:  Left main is a large sized vessel, it bifurcates into

LAD and left circumflex.  Left main is free of any significant disease, 0%

stenosis.  LAD proximal 30% stenosis, stent in the proximal LAD is patent

with 0% stenosis, mid LAD 0%, distal LAD 0%, diagonal 30%, left circumflex

proximal 20%, mid 50% and distal 70% stenosis.  Obtuse marginal stent

patent with 0% stenosis.  RCA proximal 20, mid 20, distal 20.  PDA

nonobstructive left ventricular ejection fraction normal at 60%-65%.



IMPRESSION:  Moderate left circumflex disease.



RECOMMENDATIONS:  The patient can proceed with planned surgery with low

risk for cardiac event.  Continue the patient on guideline-directed therapy

for CAD.  The patient is to follow up with her cardiologist, Dr. Kedar Lara.  Since the patient was clinically asymptomatic and left

circumflex stenosis was moderate, it was deemed to treat the patient

medically with no intervention at this point in time.







__________________________________________

Dustin Boyer MD



cc:  Kedar Lara MD



DD:  08/08/2018 12:21:31

DT:  08/08/2018 15:56:39

Job # [id number]

## 2018-08-08 NOTE — CARD
--------------- APPROVED REPORT --------------





Date of service: 08/08/2018



EKG Measurement

Heart [id number]

WI 150P13

[id number]

[id number]

[id number]



<Conclusion>

Normal sinus rhythm

Possible Inferior infarct, age undetermined

T wave abnormality, consider lateral ischemia

Abnormal ECG

## 2019-03-07 ENCOUNTER — HOSPITAL ENCOUNTER (EMERGENCY)
Dept: HOSPITAL 14 - H.ER | Age: 74
Discharge: HOME | End: 2019-03-07
Payer: MEDICARE

## 2019-03-07 VITALS — HEART RATE: 73 BPM | TEMPERATURE: 97.9 F | SYSTOLIC BLOOD PRESSURE: 129 MMHG | DIASTOLIC BLOOD PRESSURE: 88 MMHG

## 2019-03-07 VITALS — RESPIRATION RATE: 18 BRPM

## 2019-03-07 VITALS — BODY MASS INDEX: 37.8 KG/M2

## 2019-03-07 DIAGNOSIS — Z79.899: ICD-10-CM

## 2019-03-07 DIAGNOSIS — E78.00: ICD-10-CM

## 2019-03-07 DIAGNOSIS — R07.89: Primary | ICD-10-CM

## 2019-03-07 DIAGNOSIS — E11.9: ICD-10-CM

## 2019-03-07 DIAGNOSIS — Z95.5: ICD-10-CM

## 2019-03-07 DIAGNOSIS — I25.10: ICD-10-CM

## 2019-03-07 DIAGNOSIS — Z79.82: ICD-10-CM

## 2019-03-07 DIAGNOSIS — I10: ICD-10-CM

## 2019-03-07 LAB
APTT BLD: 28.3 SECONDS (ref 25.6–37.1)
BACTERIA #/AREA URNS HPF: (no result) /[HPF]
BASOPHILS # BLD AUTO: 0.1 K/UL (ref 0–0.2)
BASOPHILS NFR BLD: 1.5 % (ref 0–2)
BILIRUB UR-MCNC: NEGATIVE MG/DL
BUN SERPL-MCNC: 29 MG/DL (ref 7–17)
CALCIUM SERPL-MCNC: 9.3 MG/DL (ref 8.4–10.2)
COLOR UR: YELLOW
D DIMER: 270 NG/MLDDU (ref 0–230)
EOSINOPHIL # BLD AUTO: 0.1 K/UL (ref 0–0.7)
EOSINOPHIL NFR BLD: 1.5 % (ref 0–4)
ERYTHROCYTE [DISTWIDTH] IN BLOOD BY AUTOMATED COUNT: 15 % (ref 11.5–14.5)
GFR NON-AFRICAN AMERICAN: 44
GLUCOSE UR STRIP-MCNC: (no result) MG/DL
HGB BLD-MCNC: 11.9 G/DL (ref 12–16)
INR PPP: 1
LEUKOCYTE ESTERASE UR-ACNC: (no result) LEU/UL
LYMPHOCYTES # BLD AUTO: 2.5 K/UL (ref 1–4.3)
LYMPHOCYTES NFR BLD AUTO: 35.1 % (ref 20–40)
MCH RBC QN AUTO: 27.3 PG (ref 27–31)
MCHC RBC AUTO-ENTMCNC: 31.9 G/DL (ref 33–37)
MCV RBC AUTO: 85.7 FL (ref 81–99)
MONOCYTES # BLD: 0.7 K/UL (ref 0–0.8)
MONOCYTES NFR BLD: 9.8 % (ref 0–10)
NEUTROPHILS # BLD: 3.7 K/UL (ref 1.8–7)
NEUTROPHILS NFR BLD AUTO: 52.1 % (ref 50–75)
NRBC BLD AUTO-RTO: 0.1 % (ref 0–0)
PH UR STRIP: 5 [PH] (ref 5–8)
PLATELET # BLD: 251 K/UL (ref 130–400)
PMV BLD AUTO: 9.4 FL (ref 7.2–11.7)
PROT UR STRIP-MCNC: NEGATIVE MG/DL
PROTHROMBIN TIME: 11.6 SECONDS (ref 9.8–13.1)
RBC # BLD AUTO: 4.35 MIL/UL (ref 3.8–5.2)
RBC # UR STRIP: NEGATIVE /UL
SP GR UR STRIP: 1.02 (ref 1–1.03)
SQUAMOUS EPITHIAL: 2 /HPF (ref 0–5)
URINE CLARITY: (no result)
URINE HYALINE CAST: (no result) /HPF (ref 0–2)
UROBILINOGEN UR-MCNC: (no result) MG/DL (ref 0.2–1)
WBC # BLD AUTO: 7.1 K/UL (ref 4.8–10.8)

## 2019-03-07 PROCEDURE — 96360 HYDRATION IV INFUSION INIT: CPT

## 2019-03-07 PROCEDURE — 99285 EMERGENCY DEPT VISIT HI MDM: CPT

## 2019-03-07 PROCEDURE — 70450 CT HEAD/BRAIN W/O DYE: CPT

## 2019-03-07 PROCEDURE — 80048 BASIC METABOLIC PNL TOTAL CA: CPT

## 2019-03-07 PROCEDURE — 71275 CT ANGIOGRAPHY CHEST: CPT

## 2019-03-07 PROCEDURE — 93005 ELECTROCARDIOGRAM TRACING: CPT

## 2019-03-07 PROCEDURE — 84484 ASSAY OF TROPONIN QUANT: CPT

## 2019-03-07 PROCEDURE — 82948 REAGENT STRIP/BLOOD GLUCOSE: CPT

## 2019-03-07 PROCEDURE — 85730 THROMBOPLASTIN TIME PARTIAL: CPT

## 2019-03-07 PROCEDURE — 81003 URINALYSIS AUTO W/O SCOPE: CPT

## 2019-03-07 PROCEDURE — 85025 COMPLETE CBC W/AUTO DIFF WBC: CPT

## 2019-03-07 PROCEDURE — 85378 FIBRIN DEGRADE SEMIQUANT: CPT

## 2019-03-07 PROCEDURE — 85610 PROTHROMBIN TIME: CPT

## 2019-03-07 NOTE — ED PDOC
Syncope/Near Syncope/Dizziness





<ElliottAgusto F - Last Filed: 19 23:37>


Chief Complaint (Provider): dizziness


History Per: Patient


History/Exam Limitations: no limitations


Onset/Duration Of Symptoms: Hrs (x1), Sudden Onset


Current Symptoms Are (Timing): Better


Additional Complaint(s): 


73 year old female with past medical history of hypertension, high cholesterol, 

and CAD, presents to the emergency department for sudden onset of dizziness and 

generalized weakness that began 1 hour prior to arrival. Patient was in a 

follow-up appointment with her cardiologist when symptoms started along with 

nausea, upper abdominal pain, and mild headache. Blood pressure was noted to be 

low so patient was sent to ED for further evaluation. Patient reports taking all

of her prescribed medications including 3 different hypertensive pills. At 

present, she states that she feels better. Otherwise, she denies chest pain, 

shortness of breath, leg pain or swelling. 





PCP: Dr. Luther Ge


Cardio: Dr. Herve Bowman





<Milena Hamilton A - Last Filed: 19 11:01>


Time Seen by Provider: 19 11:45


Chief Complaint (Nursing): Chest Pain





Past Medical History


Vital Signs: 





                                Last Vital Signs











Temp  97.9 F   19 16:42


 


Pulse  73   19 16:42


 


Resp  18   19 16:42


 


BP  129/88   19 16:42


 


Pulse Ox  98   19 16:42














<ElliottAgusto F - Last Filed: 19 23:37>


Reviewed: Historical Data, Nursing Documentation, Vital Signs


Vital Signs: 





                                Last Vital Signs











Temp      


 


Pulse  63   19 11:50


 


Resp  18   19 11:50


 


BP  110/54 L  19 11:50


 


Pulse Ox  97   19 11:50














- Medical History


PMH: CAD, Diabetes, HTN, Hypercholesterolemia, Migraine


   Denies: HIV





- Surgical History


Surgical History: Coronary Stent, 


   Denies: Pacemaker





- Family History


Family History: States: Unknown Family Hx





<Milena Hamilton A - Last Filed: 19 11:01>





- Home Medications


Home Medications: 


                                Ambulatory Orders











 Medication  Instructions  Recorded


 


Aspirin [Ecotrin] 81 mg PO DAILY 19


 


Clopidogrel [Plavix] 75 mg PO DAILY 19


 


Ibuprofen [Motrin Tab] 400 mg PO Q8 PRN 19


 


Irbesartan/Hydrochlorothiazide 1 tab PO DAILY 19





[Avalide 300-12.5 mg Tablet]  


 


Labetalol [Trandate] 200 mg PO Q12 19


 


Levocetirizine Dihydrochloride 5 mg PO DAILY PRN 19





[Xyzal]  


 


Omeprazole 40 mg PO DAILY PRN 19


 


hydrALAZINE [Apresoline] 50 mg PO Q8 19














- Allergies


Allergies/Adverse Reactions: 


                                    Allergies











Allergy/AdvReac Type Severity Reaction Status Date / Time


 


enoxaparin sodium Allergy Intermediate REDNESS Verified 19 12:18





[From Lovenox]     














Review of Systems


ROS Statement: Except As Marked, All Systems Reviewed And Found Negative


Constitutional: Positive for: Weakness


Cardiovascular: Negative for: Chest Pain


Respiratory: Negative for: Shortness of Breath


Gastrointestinal: Positive for: Nausea, Abdominal Pain (upper)


Musculoskeletal: Negative for: Leg Pain (or swelling)


Neurological: Positive for: Headache (mild), Dizziness





<Milena Hamilton A - Last Filed: 19 11:01>





Physical Exam





- Reviewed


Nursing Documentation Reviewed: Yes


Vital Signs Reviewed: Yes





- Physical Exam


Appears: Positive for: No Acute Distress


Head Exam: Positive for: ATRAUMATIC, NORMAL INSPECTION, NORMOCEPHALIC


Skin: Positive for: Normal Color


Eye Exam: Positive for: Normal appearance, EOMI, PERRL


ENT: Positive for: Normal ENT Inspection.  Negative for: Pharyngeal Erythema


Neck: Positive for: Normal


Cardiovascular/Chest: Positive for: Regular Rate, Rhythm, Chest Non Tender


Respiratory: Positive for: Normal Breath Sounds.  Negative for: Wheezing, 

Respiratory Distress


Gastrointestinal/Abdominal: Positive for: Soft, Tenderness (epigastric)


Extremity: Positive for: Normal ROM (upper/lower).  Negative for: Pedal Edema, 

Calf Tenderness


Neurological/Psych: Positive for: Awake, Alert, Oriented (x3)





<Milena Hamilton A - Last Filed: 19 11:01>





- Laboratory Results


Result Diagrams: 


                                 19 12:13





                                 19 12:13


Lab Results: 





                                        











PT  11.6 Seconds (9.8-13.1)   19  12:13    


 


INR  1.0   19  12:13    


 


APTT  28.3 Seconds (25.6-37.1)   19  12:13    








                                        











D-Dimer, Quantitative  270 ng/mlDDU (0-230)  H  19  12:13    








                                        











Troponin I  < 0.0120 ng/mL (0.00-0.120)   19  12:13    








                                        











Urine Color  Yellow  (YELLOW)   19  16:00    


 


Urine Clarity  Slighty-cloudy  (Clear)   19  16:00    


 


Urine pH  5.0  (5.0-8.0)   19  16:00    


 


Ur Specific Gravity  1.020  (1.003-1.030)   19  16:00    


 


Urine Protein  Negative mg/dL (NEGATIVE)   19  16:00    


 


Urine Glucose (UA)  Neg mg/dL (NEGATIVE)   19  16:00    


 


Urine Ketones  Negative mg/dL (NEGATIVE)   19  16:00    


 


Urine Blood  Negative  (NEGATIVE)   19  16:00    


 


Urine Nitrate  Negative  (NEGATIVE)   19  16:00    


 


Urine Bilirubin  Negative  (NEGATIVE)   19  16:00    


 


Urine Urobilinogen  0.2-1.0 mg/dL (0.2-1.0)   19  16:00    


 


Ur Leukocyte Esterase  Trace Orlin/uL (Negative)   19  16:00    


 


Urine RBC (Auto)  1 /hpf (0-3)   19  16:00    


 


Urine Microscopic WBC  8 /hpf (0-5)  H  19  16:00    


 


Ur Squamous Epith Cells  2 /hpf (0-5)   19  16:00    


 


Urine Bacteria  Rare  (<OCC)   19  16:00    


 


Hyaline Casts  6-10 /hpf (0-2)  H  19  16:00    














<Agusto Gallagher - Last Filed: 19 23:37>





- Laboratory Results


Result Diagrams: 


                                 19 12:13





                                 19 12:13





- ECG


O2 Sat by Pulse Oximetry: 97 (RA)


Pulse Ox Interpretation: Normal





<Milena Hamilton - Last Filed: 03/08/19 11:01>





Medical Decision Making


Medical Decision Making: 


Initial Impression: Dizziness; epigastric pain


Differential diagnosis: side effects of hypertensive medication; near-syncope 

(orthostatic vs vasovagal). R/O ACS


Initial Plan:   


* EKG


* Labs


* IV fluids


* Zofran inj 4mg IV


* CT head





Time: 1140


--EKG: unchanged from prior studies. NSR at 66 BMP. Nonspecific changes in 

lateral leads.





Time: 1253


--CT head FINDINGS:


HEMORRHAGE:


No intracranial hemorrhage. 


BRAIN:


No mass effect or edema.  No significant atrophy.  Mild periventricular white 

matter lucency and patchy foci of deep/subcortical white matter lucency 

consistent with microvascular white matter ischemic change.  No evidence of 

acute infarct.


VENTRICLES:


Unremarkable. No hydrocephalus. 


CALVARIUM:


Unremarkable.


PARANASAL SINUSES:


Unremarkable as visualized. No significant inflammatory changes.


MASTOID AIR CELLS:


Unremarkable as visualized. No inflammatory changes.


OTHER FINDINGS:


None.


IMPRESSION:


Chronic white matter ischemic change.  No evidence of intracranial mass, 

hemorrhage or acute infarct.





Time: 1338


--CTA chest additionally ordered for further evaluation of chest/epigastric 

pain.





Time: 1500


--Patient is signed out to Dr. Gallagher, pending CTA chest results and final 

disposition.





------------------------

--------------------------------------------------------------------------------


----------------


Scribe Attestation:


Documented by Pauly Lam, acting as a scribe for Milena Hamilton MD.


Provider Scribe Attestation:


All medical record entries made by the Scribe were at my direction and pers

onally dictated by me. I have reviewed the chart and agree that the record 

accurately reflects my personal performance of the history, physical exam, 

medical decision making, and the department course for this patient. I have also

personally directed, reviewed, and agree with the discharge instructions and 

disposition.





<Milena Hamilton - Last Filed: 19 11:01>





Disposition





<Agusto Gallagher - Last Filed: 19 23:37>





- Patient ED Disposition


Is Patient to be Admitted: Transfer of Care


Counseled Patient/Family Regarding: Studies Performed, Diagnosis





- Disposition


Disposition: Transfer of Care


Disposition Time: 15:00


Patient Signed Over To: Agusto Gallagher





<Milena Hamilton - Last Filed: 19 11:01>





- Clinical Impression


Clinical Impression: 


 Near syncope, Epigastric pain








- Disposition


Referrals: 


Herve Bowman MD [Staff Provider] - 


Condition: STABLE


Instructions:  Chest Pain

## 2019-03-07 NOTE — ED PDOC
- Laboratory Results


Result Diagrams: 


                                 03/07/19 12:13





                                 03/07/19 12:13


Lab Results: 





                                        











PT  11.6 Seconds (9.8-13.1)   03/07/19  12:13    


 


INR  1.0   03/07/19  12:13    


 


APTT  28.3 Seconds (25.6-37.1)   03/07/19  12:13    








                                        











D-Dimer, Quantitative  270 ng/mlDDU (0-230)  H  03/07/19  12:13    








                                        











Troponin I  < 0.0120 ng/mL (0.00-0.120)   03/07/19  12:13    














- ECG


O2 Sat by Pulse Oximetry: 97 (RA)





Medical Decision Making


Medical Decision Making: 


Time: 1500


--Patient is endorsed to provider by Dr. Hamilton, pending CTA chest results and

final disposition.





Time: 1551


--CTA chest FINDINGS:


PULMONARY ARTERIES:


Unremarkable. No pulmonary embolism. 


AORTA:


No acute findings. No thoracic aortic aneurysm. There is atherosclerotic 

calcification of the thoracic aorta.


LUNGS:


Unremarkable. No nodule, mass or pulmonary consolidation. 


PLEURAL SPACES:


Unremarkable. No effusion or pneumothorax. 


HEART:


Unremarkable. No cardiomegaly. No significant pericardial effusion. 


LYMPH NODES:


No lymphadenopathy.


BONES, CHEST WALL:


Unremarkable. No fracture or destructive lesion 


OTHER FINDINGS:


Unremarkable. 


IMPRESSION:


No evidence of pulmonary embolism.  Unremarkable examination.





-----------------------

--------------------------------------------------------------------------------


-----------------


Scribe Attestation:


Documented by Pauly Lam, acting as a scribe for Agusto Gallagher MD.


Provider Scribe Attestation:


All medical record entries made by the Scribe were at my direction and person

ally dictated by me. I have reviewed the chart and agree that the record 

accurately reflects my personal performance of the history, physical exam, 

medical decision making, and the department course for this patient. I have also

personally directed, reviewed, and agree with the discharge instructions and 

disposition.





Disposition





- Clinical Impression


Clinical Impression: 


 Chest pain








- POA


Present On Arrival: None





- Disposition


Disposition: Hospitalized as Observation Patient


Disposition Time: 16:24


Condition: FAIR


Forms:  [facility] (English)

## 2019-03-07 NOTE — CT
Date of service: 



03/07/2019



PROCEDURE:  CT Chest with contrast (Pulmonary Angiogram)



HISTORY:

chest pain



COMPARISON:

Not available



TECHNIQUE:

Axial computed tomography images were obtained of the chest in the 

pulmonary arterial phase of enhancement. Coronal and sagittal 

reformatted images were created and reviewed.



Intravenous contrast dose: 76 mL Visipaque 320



Radiation dose:



Total exam DLP = 316.17 mGy-cm.



This CT exam was performed using one or more of the following dose 

reduction techniques: Automated exposure control, adjustment of the 

mA and/or kV according to patient size, and/or use of iterative 

reconstruction technique.



FINDINGS:



PULMONARY ARTERIES:

Unremarkable. No pulmonary embolism. 



AORTA:

No acute findings. No thoracic aortic aneurysm. There is 

atherosclerotic calcification of the thoracic aorta.



LUNGS:

Unremarkable. No nodule, mass or pulmonary consolidation. 



PLEURAL SPACES:

Unremarkable. No effusion or pneumothorax. 



HEART:

Unremarkable. No cardiomegaly. No significant pericardial effusion. 



LYMPH NODES:

No lymphadenopathy.



BONES, CHEST WALL:

Unremarkable. No fracture or destructive lesion 



OTHER FINDINGS:

Unremarkable. 



IMPRESSION:

No evidence of pulmonary embolism.  Unremarkable examination.

## 2019-03-07 NOTE — CT
Date of service: 



03/07/2019



PROCEDURE:  CT HEAD WITHOUT CONTRAST.



HISTORY:

dizziness



COMPARISON:

7/13/2018



TECHNIQUE:

Axial computed tomography images were obtained through the head/brain 

without intravenous contrast.  



Radiation dose:



Total exam DLP = 763.01 mGy-cm.



This CT exam was performed using one or more of the following dose 

reduction techniques: Automated exposure control, adjustment of the 

mA and/or kV according to patient size, and/or use of iterative 

reconstruction technique.



FINDINGS:



HEMORRHAGE:

No intracranial hemorrhage. 



BRAIN:

No mass effect or edema.  No significant atrophy.  Mild 

periventricular white matter lucency and patchy foci of 

deep/subcortical white matter lucency consistent with microvascular 

white matter ischemic change.  No evidence of acute infarct.



VENTRICLES:

Unremarkable. No hydrocephalus. 



CALVARIUM:

Unremarkable.



PARANASAL SINUSES:

Unremarkable as visualized. No significant inflammatory changes.



MASTOID AIR CELLS:

Unremarkable as visualized. No inflammatory changes.



OTHER FINDINGS:

None.



IMPRESSION:

Chronic white matter ischemic change.  No evidence of intracranial 

mass, hemorrhage or acute infarct.

## 2019-03-08 VITALS — OXYGEN SATURATION: 97 %

## 2019-03-08 NOTE — CARD
--------------- APPROVED REPORT --------------





Date of service: 03/07/2019



EKG Measurement

Heart [id number]

NM [id number]

[id number]

[id number]

[id number]



<Conclusion>

Normal sinus rhythm

T wave abnormality, consider lateral ischemia

Abnormal ECG

## 2019-03-25 ENCOUNTER — HOSPITAL ENCOUNTER (OUTPATIENT)
Dept: HOSPITAL 31 - C.LAB | Age: 74
End: 2019-03-25
Payer: MEDICARE

## 2019-03-26 ENCOUNTER — HOSPITAL ENCOUNTER (OUTPATIENT)
Dept: HOSPITAL 31 - C.CATHLAB | Age: 74
Discharge: HOME | End: 2019-03-26
Payer: MEDICARE

## 2019-03-26 VITALS — BODY MASS INDEX: 35.4 KG/M2

## 2019-03-26 DIAGNOSIS — I25.10: ICD-10-CM

## 2019-03-26 DIAGNOSIS — R42: ICD-10-CM

## 2019-03-26 DIAGNOSIS — I10: ICD-10-CM

## 2019-03-26 DIAGNOSIS — E78.00: ICD-10-CM

## 2019-03-26 DIAGNOSIS — R07.2: Primary | ICD-10-CM

## 2019-03-26 DIAGNOSIS — E11.9: ICD-10-CM

## 2019-03-26 PROCEDURE — 82948 REAGENT STRIP/BLOOD GLUCOSE: CPT

## 2019-03-26 PROCEDURE — 93660 TILT TABLE EVALUATION: CPT

## 2019-03-26 NOTE — CP.PCM.CON
History of Present Illness





- History of Present Illness


History of Present Illness: 


Patient presented for dizziness and had tilt table test performed.








Past Patient History





- Infectious Disease


Hx of Infectious Diseases: None





- Past Medical History & Family History


Past Medical History?: Yes





- Past Social History


Smoking Status: Never Smoked





- CARDIAC


Hx Cardiac Disorders: Yes


Hx Hypercholesterolemia: Yes


Hx Hypertension: Yes


Hx Pacemaker: No


Other/Comment: CAD w/ Cath w/ stent x 3





- PULMONARY


Hx Respiratory Disorders: No





- NEUROLOGICAL


Hx Neurological Disorder: Yes


Hx Migraine: Yes





- HEENT


Hx HEENT Problems: No





- ENDOCRINE/METABOLIC


Hx Endocrine Disorders: Yes


Hx Diabetes Mellitus Type 2: Yes





- HEMATOLOGICAL/ONCOLOGICAL


Hx Blood Disorders: No


Hx AIDS: No


Hx Blood Transfusions: No


Hx Blood Transfusion Reaction: No


Hx Human Immunodeficiency Virus (HIV): No





- INTEGUMENTARY


Hx Dermatological Problems: No





- MUSCULOSKELETAL/RHEUMATOLOGICAL


Hx Musculoskeletal Disorders: No


Hx Falls: Yes





- GASTROINTESTINAL


Hx Gastrointestinal Disorders: No





- GENITOURINARY/GYNECOLOGICAL


Hx Genitourinary Disorders: No





- PSYCHIATRIC


Hx Psychophysiologic Disorder: No


Hx Emotional Abuse: No


Hx Physical Abuse: No


Hx Substance Use: No





- SURGICAL HISTORY


Hx Surgeries: Yes


Hx  Section: Yes


Hx Coronary Stent: Yes


Other/Comment: Throat surgery.  Patient denies surgery during phone screen





- ANESTHESIA


Hx Anesthesia: Yes


Hx Anesthesia Reactions: No


Hx Malignant Hyperthermia: No


Has any member of the family had a problem w/ anesthesia?: No





Meds


Allergies/Adverse Reactions: 


                                    Allergies











Allergy/AdvReac Type Severity Reaction Status Date / Time


 


No Known Allergies Allergy   Verified 19 11:39














Results





- Labs


Labs: 


                         Laboratory Results - last 24 hr











  19





  12:40


 


POC Glucose (mg/dL)  141 H














Assessment & Plan





- Assessment and Plan (Free Text)


Plan: 


Tilt table test:


At 0 degrees: HR was 62 and Blood pressure was 118/56


At 30 degrees: HR was 65 and BP was 105/60


At 60 degrees, HR was 67 and BP was 107/64


At 75 degrees, HR was 77 and BP was 112/75


At 90 degrees, HR was 75 and BP was 91/64. Patient complained of slight 

dizziness and then dizziness dissipated


0.4 tab of nitro was given at 90 degree: HR was 83 and BP was 87/61. Patient was

lowered subsequently and she complained of pain in her shoulders


Patient's dizziness was unrelated to changes in blood pressure or heart rate.


Patient's symptoms are likely unrelated to cardiac cause








- Date & Time


Date: 19


Time: 16:22